# Patient Record
Sex: MALE | Race: WHITE | NOT HISPANIC OR LATINO | Employment: OTHER | URBAN - METROPOLITAN AREA
[De-identification: names, ages, dates, MRNs, and addresses within clinical notes are randomized per-mention and may not be internally consistent; named-entity substitution may affect disease eponyms.]

---

## 2017-01-10 ENCOUNTER — HOSPITAL ENCOUNTER (OUTPATIENT)
Dept: SLEEP MEDICINE | Facility: HOSPITAL | Age: 67
Discharge: HOME | End: 2017-01-10
Attending: NURSE PRACTITIONER
Payer: MEDICARE

## 2017-01-10 DIAGNOSIS — R06.83 PRIMARY SNORING: ICD-10-CM

## 2017-01-10 DIAGNOSIS — G47.61 PLMD (PERIODIC LIMB MOVEMENT DISORDER): ICD-10-CM

## 2017-01-10 DIAGNOSIS — G47.30 SLEEP APNEA, UNSPECIFIED TYPE: ICD-10-CM

## 2017-01-10 PROCEDURE — 95810 POLYSOM 6/> YRS 4/> PARAM: CPT

## 2017-01-10 PROCEDURE — 95810 POLYSOM 6/> YRS 4/> PARAM: CPT | Mod: 26,,, | Performed by: INTERNAL MEDICINE

## 2017-01-10 NOTE — Clinical Note
The polysomnography electrophysiological record for this patient has been reviewed in its entirety.  Sleep architecture:  Total sleep time was 3.8 hours. Sleep latency was short. REM latency was normal. Sleep efficiency was low. Wake after sleep onset was high: 31% of sleep period. Sleep architecture was normal.  Respiratory:   Moderate snoring recorded. The overall AHI was 3.9 (15 events). Only 1.3% of sleep was in supine position.  Motor movement / Parasomnia: No PLM's.   Cardiac: Normal, Average HR was 59 BPM.  Patient perception: It took minutes to fall asleep, slept okay, and quality same as usual.  Medications:  Amlodipine and Metoprolol.  IMPRESSION:  1. Normal Apnea Hypopnea index, No obstructive sleep apnea detected 2. Loud snoring. 3. Morbid Obesity based on BMI. 4. Mild Periodic limb movements with arousal. Clinical correlation for RLS although typically sleep onset would be delayed in RLS.  RECOMMENDATION:  1. Treatment for snoring like mandibular device and nasal Therevent

## 2017-01-13 NOTE — PROCEDURES
"The polysomnography electrophysiological record for this patient has been reviewed in its entirety.    Sleep architecture:  Total sleep time was 3.8 hours. Sleep latency was short. REM latency was normal. Sleep efficiency was low. Wake after sleep onset was high: 31% of sleep period. Sleep architecture was normal.    Respiratory:   Moderate snoring recorded. The overall AHI was 3.9 (15 events). Only 1.3% of sleep was in supine position.    Motor movement / Parasomnia: No PLMs.     Cardiac: Normal, Average HR was 59 BPM.    Patient perception: It took minutes to fall asleep, slept okay, and quality same as usual.    Medications:  Amlodipine and Metoprolol.    IMPRESSION:    1. Normal Apnea Hypopnea index, No obstructive sleep apnea detected  2. Loud snoring.  3. Morbid Obesity based on BMI.  4. Mild Periodic limb movements with arousal. Clinical correlation for RLS although typically sleep onset would be delayed in RLS.    RECOMMENDATION:    1. Treatment for snoring like mandibular device and nasal Therevent may be considered.  2. Consider ENT evaluation.  3. Improved sleep hygiene, avoid daytime napping, optimize respiratory status and if daytime sleepiness remains consider PSG with MSLT.  4. Check TSH  5. Weight loss (Significant). Good data in Behavioral-based treatment programs. 2013 AHA/ACC/TOS guideline for the management of overweight and obesity in adults: a report of the American College of Cardiology/American Heart Association Task Force on Practice Guidelines and The Obesity Society    See imported Sleep Study result in "Chart Review" under the   "Media tab".      (This Sleep Study was interpreted by a Board Certified Sleep   Specialist who conducted an epoch-by-epoch review of the entire   raw data recording.)     (The indication for this sleep study was reviewed and deemed   appropriate by AASM Practice Parameters or other reasons by a   Board Certified Sleep Specialist.)    Cr Leonardo MD      "

## 2017-01-17 ENCOUNTER — TELEPHONE (OUTPATIENT)
Dept: PULMONOLOGY | Facility: CLINIC | Age: 67
End: 2017-01-17

## 2017-01-17 NOTE — TELEPHONE ENCOUNTER
----- Message from Trey Nunes MA sent at 1/16/2017 10:59 AM CST -----  Contact: pt      ----- Message -----     From: Hipolito Alfaro     Sent: 1/16/2017  10:47 AM       To: [facility] Staff    He's calling in regards to the RX for an inhaler, pt states his INS will not cover this medication, please advise, [phone] (home)

## 2017-01-17 NOTE — TELEPHONE ENCOUNTER
Pt states ins won't pay for Advair - is there an alternative or should I send to [facility]?

## 2017-01-20 ENCOUNTER — TELEPHONE (OUTPATIENT)
Dept: PHARMACY | Facility: CLINIC | Age: 67
End: 2017-01-20

## 2017-01-20 NOTE — TELEPHONE ENCOUNTER
Tried to contact patient concerning referral for medication.  Left message for patient to return my call.

## 2017-01-25 ENCOUNTER — OFFICE VISIT (OUTPATIENT)
Dept: NEPHROLOGY | Facility: CLINIC | Age: 67
End: 2017-01-25
Payer: MEDICARE

## 2017-01-25 VITALS
HEIGHT: 71 IN | BODY MASS INDEX: 33.4 KG/M2 | WEIGHT: 238.56 LBS | HEART RATE: 52 BPM | SYSTOLIC BLOOD PRESSURE: 126 MMHG | DIASTOLIC BLOOD PRESSURE: 74 MMHG

## 2017-01-25 DIAGNOSIS — N40.1 BENIGN NON-NODULAR PROSTATIC HYPERPLASIA WITH LOWER URINARY TRACT SYMPTOMS: ICD-10-CM

## 2017-01-25 DIAGNOSIS — I10 ESSENTIAL HYPERTENSION: ICD-10-CM

## 2017-01-25 DIAGNOSIS — Z87.442 HISTORY OF NEPHROLITHIASIS: ICD-10-CM

## 2017-01-25 DIAGNOSIS — N18.31 CHRONIC KIDNEY DISEASE (CKD) STAGE G3A/A1, MODERATELY DECREASED GLOMERULAR FILTRATION RATE (GFR) BETWEEN 45-59 ML/MIN/1.73 SQUARE METER AND ALBUMINURIA CREATININE RATIO LESS THAN 30 MG/G: Primary | ICD-10-CM

## 2017-01-25 PROCEDURE — 99999 PR PBB SHADOW E&M-EST. PATIENT-LVL III: CPT | Mod: PBBFAC,,, | Performed by: INTERNAL MEDICINE

## 2017-01-25 PROCEDURE — 3074F SYST BP LT 130 MM HG: CPT | Mod: S$GLB,,, | Performed by: INTERNAL MEDICINE

## 2017-01-25 PROCEDURE — 1157F ADVNC CARE PLAN IN RCRD: CPT | Mod: S$GLB,,, | Performed by: INTERNAL MEDICINE

## 2017-01-25 PROCEDURE — 1160F RVW MEDS BY RX/DR IN RCRD: CPT | Mod: S$GLB,,, | Performed by: INTERNAL MEDICINE

## 2017-01-25 PROCEDURE — 3078F DIAST BP <80 MM HG: CPT | Mod: S$GLB,,, | Performed by: INTERNAL MEDICINE

## 2017-01-25 PROCEDURE — 99204 OFFICE O/P NEW MOD 45 MIN: CPT | Mod: S$GLB,,, | Performed by: INTERNAL MEDICINE

## 2017-01-25 PROCEDURE — 1159F MED LIST DOCD IN RCRD: CPT | Mod: S$GLB,,, | Performed by: INTERNAL MEDICINE

## 2017-01-25 PROCEDURE — 1126F AMNT PAIN NOTED NONE PRSNT: CPT | Mod: S$GLB,,, | Performed by: INTERNAL MEDICINE

## 2017-01-25 NOTE — PATIENT INSTRUCTIONS
1.  Chronic kidney disease stage III most likely obstructive nephropathy.  Agree with stopping nonsteroidals.  Continue with the current medications.  Patient is on ACE inhibitor.  Echo and cardiac workup noted.  Blood pressures well controlled.  He can proceed with IV dye if we couldn't give him hydration before and after the CT scan with contrast.  Will do routine workup on proteinuria and renal ultrasound and screening for hyperparathyroidism and vitamin D deficiency.      Proceed with IV dye with hydration before and after the scan

## 2017-01-25 NOTE — MR AVS SNAPSHOT
[facility]  [address] [address]  Phone: [phone]  Fax: [phone]                  Monster Clinton   2017 8:30 AM   Office Visit    Description:  Male : 1950   Provider:  Nini Melgar MD   Department:  [facility]           Reason for Visit     Consult     Chronic Kidney Disease           Diagnoses this Visit        Comments    Chronic kidney disease (CKD) stage G3a/A1, moderately decreased glomerular filtration rate (GFR) between 45-59 mL/min/1.73 square meter and albuminuria creatinine ratio less than 30 mg/g    -  Primary     Essential hypertension         Benign non-nodular prostatic hyperplasia with lower urinary tract symptoms         History of nephrolithiasis                To Do List           Future Appointments        Provider Department Dept Phone    2017 8:20 AM Teri Valencia NP [facility] - Pulmonary Services [phone]    3/7/2017 8:00 AM ONLH XR1-DR [facility] [phone]    3/7/2017 8:20 AM SPIROMETRY, [facility] Pulm Function [facility] [phone]    3/7/2017 8:40 AM Mark Izaugirre MD [facility] Pulmonary Services [phone]    2017 9:30 AM ONLH US1 [facility] [phone]      Goals (5 Years of Data)     None      Follow-Up and Disposition     Return in about 3 months (around 2017).    Follow-up and Disposition History      [facility]     [facility] Nurse Care Line -  Assistance  Registered nurses in the [facility] Center provide clinical advisement, health education, appointment booking, and other advisory services.  Call for this free service at [phone].             Medications           Message regarding Medications     Verify the changes and/or additions to your medication regime listed below are the same as discussed with your clinician today.  If any of these changes or additions are incorrect, please notify your healthcare  "provider.             Verify that the below list of medications is an accurate representation of the medications you are currently taking.  If none reported, the list may be blank. If incorrect, please contact your healthcare provider. Carry this list with you in case of emergency.           Current Medications     albuterol (VENTOLIN HFA) 90 mcg/actuation inhaler Inhale 2 puffs into the lungs every 4 (four) hours as needed for Wheezing or Shortness of Breath.    amlodipine-benazepril 5-20 mg (LOTREL) 5-20 mg per capsule Take 1 capsule by mouth once daily.    ascorbic acid (VITAMIN C) 1000 MG tablet Take 1,000 mg by mouth once daily.    DIETARY SUPPLEMENT ORAL Take 2 drops by mouth 3 (three) times daily. Tumeric    finasteride (PROSCAR) 5 mg tablet Take 5 mg by mouth once daily.    fluticasone-salmeterol 250-50 mcg/dose (ADVAIR) 250-50 mcg/dose diskus inhaler Inhale 1 puff into the lungs 2 (two) times daily.    hydrochlorothiazide (HYDRODIURIL) 12.5 MG Tab Take 12.5 mg by mouth once daily.    metoprolol tartrate (LOPRESSOR) 50 MG tablet     multivitamin capsule Take 1 capsule by mouth once daily.           Clinical Reference Information           Vital Signs - Last Recorded  Most recent update: 1/25/2017  8:11 AM by Kalpana Jenkins LPN    BP Pulse Ht Wt BMI    126/74 (!) 52 5' 11" (1.803 m) 108.2 kg (238 lb 8.6 oz) 33.27 kg/m2      Blood Pressure          Most Recent Value    BP  126/74      Allergies as of 1/25/2017     Ibuprofen      Immunizations Administered on Date of Encounter - 1/25/2017     None      Orders Placed During Today's Visit     Future Labs/Procedures Expected by Expires    Cystatin C, Serum  1/25/2017 3/26/2018    US Retroperitoneal Complete (Kidney and  1/25/2017 1/25/2018    Vitamin D  1/25/2017 3/26/2018    Recurring Lab Work Interval Expires    CBC auto differential   3/26/2018    PTH, intact   3/26/2018    Renal function panel   3/26/2018    Protein / creatinine ratio, urine   3/26/2018    " Urinalysis   3/26/2018      Instructions      1.  Chronic kidney disease stage III most likely obstructive nephropathy.  Agree with stopping nonsteroidals.  Continue with the current medications.  Patient is on ACE inhibitor.  Echo and cardiac workup noted.  Blood pressures well controlled.  He can proceed with IV dye if we couldn't give him hydration before and after the CT scan with contrast.  Will do routine workup on proteinuria and renal ultrasound and screening for hyperparathyroidism and vitamin D deficiency.      Proceed with IV dye with hydration before and after the scan

## 2017-01-25 NOTE — PROGRESS NOTES
Subjective:       Patient ID: Monster Clinton is a 66 y.o. White male who presents for new evaluation of Consult and Chronic Kidney Disease    HPI     Patient is a 66-year-old male with hypertension and history of shortness of breath with history of exposure to asbestosis and remote history of smoking.  He quit drinking alcohol in many years ago.  He is being evaluated for his shortness of breath and his pulmonary disease.  He was noted to have a creatinine of 1.5 and a consultation is requested.  Patient has chronic history of hematuria with multiple episodes of pain in the abdomen.  Patient has been diagnosed with nephrolithiasis in the past.  In the year 2015 he didn't require stent placement in his ureters by urology Dr. ji.  Stent has been removed.  He has been followed by urology constantly for his BPH.  He has had multiple operations on his prostate.  He is now on Proscar.  He does not have much nocturia anymore.  Denies any swelling of the legs.  He is on hydrochlorothiazide.  He gets swelling in the morning in his hands and his face if he does not take hydrochlorothiazide.  His blood pressure has been very well controlled.  He has severe ALLERGY to ibuprofen for which he has to be addressed to the emergency room because of the ALLERGIC reaction.  He is not on any nonsteroidals at this time.        Review of Systems   Constitutional: Negative.  Negative for activity change, appetite change, chills, diaphoresis, fatigue and fever.   HENT: Negative.  Negative for congestion and trouble swallowing.    Eyes: Negative.    Respiratory: Positive for cough and shortness of breath. Negative for chest tightness and wheezing.    Cardiovascular: Negative.  Negative for chest pain, palpitations and leg swelling.   Gastrointestinal: Negative.  Negative for abdominal distention, abdominal pain, nausea and vomiting.   Genitourinary: Negative.  Negative for decreased urine volume, difficulty urinating, dysuria, enuresis,  "flank pain, frequency, hematuria, penile swelling, scrotal swelling and urgency.   Musculoskeletal: Negative.  Negative for arthralgias, back pain, joint swelling and neck pain.   Skin: Negative for rash.   Neurological: Negative.  Negative for tremors, seizures and headaches.   Psychiatric/Behavioral: Negative.  Negative for confusion and sleep disturbance. The patient is not nervous/anxious.        Objective:     Visit Vitals    /74    Pulse (!) 52    Ht 5' 11" (1.803 m)    Wt 108.2 kg (238 lb 8.6 oz)    BMI 33.27 kg/m2        Physical Exam   Constitutional: He is oriented to person, place, and time. He appears well-developed and well-nourished. No distress.   HENT:   Head: Normocephalic.   Eyes: EOM are normal. Pupils are equal, round, and reactive to light.   Neck: Normal range of motion. Neck supple. No JVD present. No thyromegaly present.   Cardiovascular: Normal rate, regular rhythm, S1 normal, S2 normal, normal heart sounds and intact distal pulses.  PMI is not displaced.  Exam reveals no gallop and no friction rub.    No murmur heard.  Pulmonary/Chest: Effort normal and breath sounds normal. No respiratory distress. He has no wheezes. He has no rales. He exhibits no tenderness.   Abdominal: He exhibits no distension and no mass. There is no hepatosplenomegaly. There is no tenderness. There is no rebound and no CVA tenderness. No hernia.   Musculoskeletal: Normal range of motion. He exhibits no edema or tenderness.   Lymphadenopathy:     He has no cervical adenopathy.   Neurological: He is alert and oriented to person, place, and time. He has normal reflexes. He is not disoriented. He displays normal reflexes. No cranial nerve deficit. He exhibits normal muscle tone. Coordination normal.   Skin: Skin is warm and dry. No rash noted. No erythema.   Good circulation in the feet with 2+ DIP and no evidence of cholesterol emboli   Psychiatric: He has a normal mood and affect. His behavior is normal. " Judgment and thought content normal.         Lab Results   Component Value Date    CREATININE 1.5 (H) 12/12/2016    BUN 27 (H) 12/12/2016     01/11/2011    K 3.6 01/11/2011     01/11/2011    CO2 30.8 (H) 01/11/2011     No results found for: WBC, HGB, HCT, MCV, PLT  Lab Results   Component Value Date    CALCIUM 8.5 (L) 01/11/2011       Assessment:    )    1. Chronic kidney disease (CKD) stage G3a/A1, moderately decreased glomerular filtration rate (GFR) between 45-59 mL/min/1.73 square meter and albuminuria creatinine ratio less than 30 mg/g    2. Essential hypertension    3. Benign non-nodular prostatic hyperplasia with lower urinary tract symptoms    4. History of nephrolithiasis        Plan:         1.  Chronic kidney disease stage III most likely obstructive nephropathy.  Agree with stopping nonsteroidals.  Continue with the current medications.  Patient is on ACE inhibitor.  Echo and cardiac workup noted.  Blood pressures well controlled.  He can proceed with IV dye if we couldn't give him hydration before and after the CT scan with contrast.  Will do routine workup on proteinuria and renal ultrasound and screening for hyperparathyroidism and vitamin D deficiency.      Proceed with IV dye with hydration before and after the scan       Nini Melgar MD

## 2017-01-25 NOTE — LETTER
January 25, 2017      Teri Valencia, NP  [address] [facility] [address]           [facility]  [address] [place] [address]  Phone: [phone]  Fax: [phone]          Patient: Monster Clintno   MR Number: [id number]   YOB: 1950   Date of Visit: 1/25/2017       Dear Teri Valencia:    Thank you for referring Monster Clinton to me for evaluation. Attached you will find relevant portions of my assessment and plan of care.    If you have questions, please do not hesitate to call me. I look forward to following Monster Clinton along with you.    Sincerely,    Nini Melgar MD    Enclosure  CC:  MD Raymond Marin III, MD    If you would like to receive this communication electronically, please contact [email or web address] or [phone] to request more information on [employer] Link access.    For providers and/or their staff who would like to refer a patient to [facility], please contact us through our one-stop-shop provider referral line, [facility], at [phone].    If you feel you have received this communication in error or would no longer like to receive these types of communications, please e-mail [email or web address]

## 2017-01-27 ENCOUNTER — OFFICE VISIT (OUTPATIENT)
Dept: PULMONOLOGY | Facility: CLINIC | Age: 67
End: 2017-01-27
Payer: MEDICARE

## 2017-01-27 VITALS
WEIGHT: 241.19 LBS | SYSTOLIC BLOOD PRESSURE: 130 MMHG | BODY MASS INDEX: 33.77 KG/M2 | DIASTOLIC BLOOD PRESSURE: 84 MMHG | HEART RATE: 59 BPM | HEIGHT: 71 IN | OXYGEN SATURATION: 97 %

## 2017-01-27 DIAGNOSIS — R91.8 GROUND GLASS OPACITY PRESENT ON IMAGING OF LUNG: ICD-10-CM

## 2017-01-27 DIAGNOSIS — R06.02 SOB (SHORTNESS OF BREATH) ON EXERTION: ICD-10-CM

## 2017-01-27 DIAGNOSIS — J44.9 COPD, MILD: Primary | ICD-10-CM

## 2017-01-27 DIAGNOSIS — J61 PULMONARY ASBESTOSIS: ICD-10-CM

## 2017-01-27 PROCEDURE — 3075F SYST BP GE 130 - 139MM HG: CPT | Mod: S$GLB,,, | Performed by: NURSE PRACTITIONER

## 2017-01-27 PROCEDURE — 99214 OFFICE O/P EST MOD 30 MIN: CPT | Mod: S$GLB,,, | Performed by: NURSE PRACTITIONER

## 2017-01-27 PROCEDURE — 1160F RVW MEDS BY RX/DR IN RCRD: CPT | Mod: S$GLB,,, | Performed by: NURSE PRACTITIONER

## 2017-01-27 PROCEDURE — 1157F ADVNC CARE PLAN IN RCRD: CPT | Mod: S$GLB,,, | Performed by: NURSE PRACTITIONER

## 2017-01-27 PROCEDURE — 99999 PR PBB SHADOW E&M-EST. PATIENT-LVL III: CPT | Mod: PBBFAC,,, | Performed by: NURSE PRACTITIONER

## 2017-01-27 PROCEDURE — 1159F MED LIST DOCD IN RCRD: CPT | Mod: S$GLB,,, | Performed by: NURSE PRACTITIONER

## 2017-01-27 PROCEDURE — 3079F DIAST BP 80-89 MM HG: CPT | Mod: S$GLB,,, | Performed by: NURSE PRACTITIONER

## 2017-01-27 NOTE — MR AVS SNAPSHOT
[facility]  [address] [address]  Phone: [phone]  Fax: [phone]                  Monster Clinton   2017 8:20 AM   Office Visit    Description:  Male : 1950   Provider:  Teri Valencia NP   Department:  [facility]           Reason for Visit     Sleep Apnea           Diagnoses this Visit        Comments    COPD, mild    -  Primary sob and wheezing resolved with beginning Advair, Advair too expensive want alternate, new script for incruse covered by insurance.     Pulmonary asbestosis     exposure to     Ground glass opacity present on imaging of lung     CT 2016, no nodules.     SOB (shortness of breath) on exertion     resolved with advair           To Do List           Future Appointments        Provider Department Dept Phone    3/10/2017 7:45 AM ONL XR1- [facility] [phone]    3/10/2017 8:00 AM PULMONARY LAB, [facility] - Pulm Function Svcs [phone]    3/10/2017 8:20 AM Teri Valencia NP [facility]monary Services [phone]    2017 9:30 AM ONLH US1 [facility] [phone]    2017 10:20 AM SPECIMEN, 'NEAL [facility] [phone]      Goals (5 Years of Data)     None      Follow-Up and Disposition     Return in about 6 weeks (around 3/7/2017).    Follow-up and Disposition History       These Medications        Disp Refills Start End    umeclidinium 62.5 mcg/actuation DsDv 30 each 12 2017     Inhale 62.5 mcg into the lungs once daily. Controller - Inhalation    [facility] [address] #: [phone]         [facility]     [facility] On [facility] Nurse Care Line -  Assistance  Registered nurses in the [facility] On [facility] Center provide clinical advisement, health education, appointment booking, and other advisory services.  Call for this free service at  "[phone].             Medications           Message regarding Medications     Verify the changes and/or additions to your medication regime listed below are the same as discussed with your clinician today.  If any of these changes or additions are incorrect, please notify your healthcare provider.        START taking these NEW medications        Refills    umeclidinium 62.5 mcg/actuation DsDv 12    Sig: Inhale 62.5 mcg into the lungs once daily. Controller    Class: Normal    Route: Inhalation           Verify that the below list of medications is an accurate representation of the medications you are currently taking.  If none reported, the list may be blank. If incorrect, please contact your healthcare provider. Carry this list with you in case of emergency.           Current Medications     albuterol (VENTOLIN HFA) 90 mcg/actuation inhaler Inhale 2 puffs into the lungs every 4 (four) hours as needed for Wheezing or Shortness of Breath.    amlodipine-benazepril 5-20 mg (LOTREL) 5-20 mg per capsule Take 1 capsule by mouth once daily.    ascorbic acid (VITAMIN C) 1000 MG tablet Take 1,000 mg by mouth once daily.    DIETARY SUPPLEMENT ORAL Take 2 drops by mouth 3 (three) times daily. Tumeric    finasteride (PROSCAR) 5 mg tablet Take 5 mg by mouth once daily.    fluticasone-salmeterol 250-50 mcg/dose (ADVAIR) 250-50 mcg/dose diskus inhaler Inhale 1 puff into the lungs 2 (two) times daily.    hydrochlorothiazide (HYDRODIURIL) 12.5 MG Tab Take 12.5 mg by mouth once daily.    metoprolol tartrate (LOPRESSOR) 50 MG tablet     multivitamin capsule Take 1 capsule by mouth once daily.    umeclidinium 62.5 mcg/actuation DsDv Inhale 62.5 mcg into the lungs once daily. Controller           Clinical Reference Information           Vital Signs - Last Recorded  Most recent update: 1/27/2017  8:17 AM by Eri Bettencourt LPN    BP Pulse Ht Wt SpO2 BMI    130/84 (!) 59 5' 11" (1.803 m) 109.4 kg (241 lb 2.9 oz) 97% 33.64 kg/m2    "   Blood Pressure          Most Recent Value    BP  130/84      Allergies as of 1/27/2017     Ibuprofen      Immunizations Administered on Date of Encounter - 1/27/2017     None

## 2017-01-27 NOTE — PROGRESS NOTES
Established Patient    Subjective:      Patient ID: Monster Clinton is a 66 y.o. male.    Patient Active Problem List   Diagnosis    Pulmonary asbestosis    Essential hypertension    Stroke    SOB (shortness of breath)    Sleep apnea    Primary snoring    PLMD (periodic limb movement disorder)    Benign non-nodular prostatic hyperplasia with lower urinary tract symptoms    History of nephrolithiasis       Problem list has been reviewed.    he has been referred by No ref. provider found for evaluation and management for   Chief Complaint   Patient presents with    Sleep Apnea     review sleep fu       Chief Complaint: Sleep Apnea (review sleep fu)      HPI:  He presents for review of sleep study done on 1/10/2017. No sleep apnea revealed.   He reports his of shortness of breath has resolved with begin Advair inhaler. Since beginning Adviar he has not had to use Albuterol inhaler. He has tried nasal Therevent in past for snoring and did not find worked well and found uncomfortable so abandoned wearing.     Previous Report Reviewed: lab reports, office notes and radiology reports     Past Medical History: The following portions of the patient's history were reviewed and updated as appropriate:   He  has a past surgical history that includes Hernia repair and ostate surgery.  His family history is not on file.  He  reports that he quit smoking about 38 years ago. He does not have any smokeless tobacco history on file. He reports that he does not drink alcohol or use illicit drugs.  He has a current medication list which includes the following prescription(s): albuterol, amlodipine-benazepril 5-20 mg, ascorbic acid (vitamin c), dietary supplement, finasteride, fluticasone-salmeterol 250-50 mcg/dose, hydrochlorothiazide, metoprolol tartrate, multivitamin, and umeclidinium.  He is allergic to ibuprofen..    Review of Systems   Constitutional: Negative for fever, activity change, appetite change and night sweats (  "states has not been having nights sweats as in past).   HENT: Negative for postnasal drip (improved with beginning flonase) and rhinorrhea.    Eyes: Negative.    Respiratory: Positive for dyspnea on extertion (resolved with beginning Advair). Negative for apnea, cough, shortness of breath (resolved with advair), wheezing (resolved with beginning advair) and use of rescue inhaler.    Genitourinary: Negative.    Endocrine: Negative for cold intolerance and heat intolerance.    Musculoskeletal: Positive for arthralgias (knees and shoulders). Negative for back pain, gait problem and joint swelling.   Gastrointestinal: Positive for acid reflux (takes rolaids and controlled with diet, not eating big meal past 4 pm). Negative for nausea and vomiting.   Neurological: Positive for headaches.   Psychiatric/Behavioral: Negative for sleep disturbance (improved ). The patient is not nervous/anxious.         Objective:     Physical Exam   Constitutional: He is oriented to person, place, and time. He appears well-developed and well-nourished. He is active and cooperative.  Non-toxic appearance. He does not appear ill. No distress.   HENT:   Head: Normocephalic and atraumatic.   Nose: Nose normal.   Mouth/Throat: Oropharynx is clear and moist. No oropharyngeal exudate.   Neck circumference 18-1/2 inches  Mallampati: 4   Eyes: Conjunctivae are normal.   Cardiovascular: Normal rate, regular rhythm and normal heart sounds.    Pulmonary/Chest: Effort normal. No stridor. He has no wheezes. Rales:  very fine crackles in bases posterior    Abdominal: Soft.   Musculoskeletal: He exhibits no edema.   Neurological: He is alert and oriented to person, place, and time.   Skin: Skin is warm and dry.   Psychiatric: He has a normal mood and affect. His behavior is normal. Judgment and thought content normal.   Vitals reviewed.    Vitals:    01/27/17 0816   BP: 130/84   Pulse: (!) 59   SpO2: 97%   Weight: 109.4 kg (241 lb 2.9 oz)   Height: 5' 11" " (1.803 m)     Body mass index is 33.64 kg/(m^2).    Personal Diagnostic Review  CT of chest 2016   There is a pattern of groundglass type abnormal increased tissue markings greatest in the right upper lobe and in the midlung fields.  No focal suspicious area of consolidation is demonstrated.  No pleural surfaces appear free of any significant nodules or calcification.  Extensive cystic lesions are seen within the liver.    Lab    2016  Lab: Inflammatory markers within acceptable normal limits     Pulmonary function tests: 2016  FEV1: 2.35  (68 % predicted), FVC:  3.22 (69 % predicted), FEV1/FVC:  73 (93% predicted), T.34 (79 % predicted), RV/TLVC: 36 (89 % predicted), DLCO: 18.8 (72 % predicted)  Patient interpretation: Normal airflow. Mild restriction. Diffusion capacity is mildly reduced but corrects for alveolar volume.    Sleep study 1/10/2017  IMPRESSION:     1. Normal Apnea Hypopnea index, No obstructive sleep apnea detected  2. Loud snoring.  3. Morbid Obesity based on BMI.  4. Mild Periodic limb movements with arousal. Clinical correlation for RLS although typically sleep onset would be delayed in RLS.     RECOMMENDATION:     1. Treatment for snoring like mandibular device and nasal Therevent may be considered.  2. Consider ENT evaluation.  3. Improved sleep hygiene, avoid daytime napping, optimize respiratory status and if daytime sleepiness remains consider PSG with MSLT.  4. Check TSH  5. Weight loss (Significant).     Assessment:     1. COPD, mild    2. Pulmonary asbestosis    3. Ground glass opacity present on imaging of lung    4. SOB (shortness of breath) on exertion      No orders of the defined types were placed in this encounter.    Plan:     Discussed diagnosis, its evaluation, treatment and usual course. All questions answered.    COPD, mild  Comments:  sob and wheezing resolved with beginning Advair, Advair too expensive want alternate, new script for incruse covered by  insurance.   Orders:  -     umeclidinium 62.5 mcg/actuation DsDv; Inhale 62.5 mcg into the lungs once daily. Controller  Dispense: 30 each; Refill: 12    Pulmonary asbestosis  Comments:  exposure to     Ground glass opacity present on imaging of lung  Comments:  CT 12/12/2016, no nodules.     SOB (shortness of breath) on exertion  Comments:  resolved with advair      Will stop advair for trial of incruse if shortness of breath returns will add steroid inhaler to incruse.     MEDICAL DECISION MAKING: Moderate complexity.  Overall, the multiple problems listed are of moderate to high severity that may impact quality of life and activities of daily living. Side effects of medications, treatment plan as well as options and alternatives reviewed and discussed with patient. There was counseling of patient concerning these issues and inhaler instruction.     TIME SPENT WITH PATIENT: Time spent:25 minutes in face to face  discussion concerning diagnosis, prognosis, review of lab and test results, benefits of treatment as well as management of disease, counseling of patient and coordination of care between various health  care providers . Greater than half the time spent was used for coordination of care and counseling of patient.     Return in about 6 weeks (around 3/7/2017).

## 2017-11-10 ENCOUNTER — OFFICE VISIT (OUTPATIENT)
Dept: FAMILY MEDICINE | Facility: CLINIC | Age: 67
End: 2017-11-10
Payer: MEDICARE

## 2017-11-10 ENCOUNTER — TELEPHONE (OUTPATIENT)
Dept: FAMILY MEDICINE | Facility: CLINIC | Age: 67
End: 2017-11-10

## 2017-11-10 VITALS
WEIGHT: 240 LBS | SYSTOLIC BLOOD PRESSURE: 136 MMHG | OXYGEN SATURATION: 98 % | DIASTOLIC BLOOD PRESSURE: 80 MMHG | RESPIRATION RATE: 14 BRPM | BODY MASS INDEX: 33.6 KG/M2 | TEMPERATURE: 98 F | HEART RATE: 60 BPM | HEIGHT: 71 IN

## 2017-11-10 DIAGNOSIS — J61 PULMONARY ASBESTOSIS: ICD-10-CM

## 2017-11-10 DIAGNOSIS — I10 ESSENTIAL HYPERTENSION: Primary | ICD-10-CM

## 2017-11-10 DIAGNOSIS — Z12.11 COLON CANCER SCREENING: ICD-10-CM

## 2017-11-10 DIAGNOSIS — Z11.59 NEED FOR HEPATITIS C SCREENING TEST: ICD-10-CM

## 2017-11-10 DIAGNOSIS — G47.30 SLEEP APNEA, UNSPECIFIED TYPE: ICD-10-CM

## 2017-11-10 DIAGNOSIS — H91.93 BILATERAL HEARING LOSS, UNSPECIFIED HEARING LOSS TYPE: ICD-10-CM

## 2017-11-10 DIAGNOSIS — Z86.73 HISTORY OF CVA (CEREBROVASCULAR ACCIDENT): ICD-10-CM

## 2017-11-10 DIAGNOSIS — Z87.891 HISTORY OF SMOKING: ICD-10-CM

## 2017-11-10 DIAGNOSIS — J44.9 COPD, MILD: ICD-10-CM

## 2017-11-10 DIAGNOSIS — I70.0 AORTIC ATHEROSCLEROSIS: ICD-10-CM

## 2017-11-10 DIAGNOSIS — I51.89 DIASTOLIC DYSFUNCTION: ICD-10-CM

## 2017-11-10 DIAGNOSIS — G47.61 PLMD (PERIODIC LIMB MOVEMENT DISORDER): ICD-10-CM

## 2017-11-10 DIAGNOSIS — N40.1 BENIGN NON-NODULAR PROSTATIC HYPERPLASIA WITH LOWER URINARY TRACT SYMPTOMS: ICD-10-CM

## 2017-11-10 DIAGNOSIS — N18.31 CHRONIC KIDNEY DISEASE (CKD) STAGE G3A/A1, MODERATELY DECREASED GLOMERULAR FILTRATION RATE (GFR) BETWEEN 45-59 ML/MIN/1.73 SQUARE METER AND ALBUMINURIA CREATININE RATIO LESS THAN 30 MG/G: ICD-10-CM

## 2017-11-10 DIAGNOSIS — Z13.6 SCREENING FOR AAA (ABDOMINAL AORTIC ANEURYSM): ICD-10-CM

## 2017-11-10 PROCEDURE — 99214 OFFICE O/P EST MOD 30 MIN: CPT | Mod: S$GLB,,, | Performed by: FAMILY MEDICINE

## 2017-11-10 PROCEDURE — 99999 PR PBB SHADOW E&M-EST. PATIENT-LVL IV: CPT | Mod: PBBFAC,,, | Performed by: FAMILY MEDICINE

## 2017-11-10 RX ORDER — TRIAMCINOLONE ACETONIDE 1 MG/G
CREAM TOPICAL
COMMUNITY
Start: 2017-10-06

## 2017-11-10 RX ORDER — AMLODIPINE AND BENAZEPRIL HYDROCHLORIDE 5; 20 MG/1; MG/1
1 CAPSULE ORAL DAILY
Qty: 90 CAPSULE | Refills: 0 | Status: SHIPPED | OUTPATIENT
Start: 2017-11-10 | End: 2018-02-13 | Stop reason: SDUPTHER

## 2017-11-10 RX ORDER — METOPROLOL TARTRATE 50 MG/1
50 TABLET ORAL 2 TIMES DAILY
Qty: 180 TABLET | Refills: 0 | Status: SHIPPED | OUTPATIENT
Start: 2017-11-10 | End: 2018-02-13 | Stop reason: SDUPTHER

## 2017-11-10 RX ORDER — HYDROCHLOROTHIAZIDE 12.5 MG/1
12.5 TABLET ORAL DAILY
Qty: 90 TABLET | Refills: 0 | Status: SHIPPED | OUTPATIENT
Start: 2017-11-10 | End: 2018-02-13 | Stop reason: SDUPTHER

## 2017-11-10 NOTE — TELEPHONE ENCOUNTER
"Patient states that he does not want to schedule   The U/S aorta Abdominal/ "he is going hunting " will call back to schedule.  "

## 2017-11-10 NOTE — LETTER
ALEXIS CLINTON   1950             [facility]  [address]  [facility] [address]  Phone: [phone]  Fax: [phone] November 10, 2017     Patient: Alexis Clinton    YOB: 1950   Date of Visit: 11/10/2017       To Whom It May Concern:     Dr. Baugh is requesting a copy of pt's medical records   Please fax records to [phone]    If you have any questions or concerns, please don't hesitate to call.    Sincerely,  Brianna K., [facility]  Phone #[phone] Fax # [phone]

## 2017-11-10 NOTE — PROGRESS NOTES
Subjective:       Patient ID: Monster Clinton is a 67 y.o. male.    Chief Complaint: Hypertension; COPD; and Establish Care    Hypertension   This is a chronic problem. The current episode started more than 1 year ago. The problem is unchanged. The problem is controlled. Pertinent negatives include no chest pain, headaches, malaise/fatigue, orthopnea, palpitations, peripheral edema or shortness of breath. There are no associated agents to hypertension. Risk factors for coronary artery disease include obesity and male gender. Past treatments include ACE inhibitors, calcium channel blockers and beta blockers. The current treatment provides significant improvement. There are no compliance problems.  Hypertensive end-organ damage includes CVA. There is no history of CAD/MI, heart failure or a thyroid problem. Identifiable causes of hypertension include sleep apnea. There is no history of coarctation of the aorta.     COPD  Patient reports having diagnosis of mild COPD. He admits to having occasional shortness of breath that is alleviated with use of Ventolin. He notes having cough intermittently but this is attributed to choking. He notes that the choking stems from his CVA. He has been evaluated by Pulmonology but has not had an assessment since January 2017. He has not had any ER visits or hospitalizations due to COPD. He has known asbestosis exposure remotely. Last CXR was obtained in December 2016 without acute findings. He does not vaccinate- no flu or PNA vaccinations previously and he declines updates. In late 2016 he underwent assessment per Cardiology in light of SOB complaint. Echocardiogram findings are consistent with diastolic dysfunction. Stress testing was negative for ischemia.    Past Medical History:   Diagnosis Date    Hypertension     Pulmonary asbestosis     Sleep apnea     Stroke      Past Surgical History:   Procedure Laterality Date    HERNIA REPAIR      PROSTATE SURGERY       History  "reviewed. No pertinent family history.    Review of Systems   Constitutional: Negative for appetite change, fatigue, malaise/fatigue and unexpected weight change.   HENT: Positive for hearing loss (chronic ). Negative for congestion, sore throat and trouble swallowing.    Eyes: Negative for pain and visual disturbance.   Respiratory: Negative for cough, chest tightness and shortness of breath.    Cardiovascular: Negative for chest pain, palpitations and orthopnea.   Gastrointestinal: Negative for abdominal pain, blood in stool, constipation and diarrhea.   Endocrine: Negative for cold intolerance and heat intolerance.   Genitourinary: Negative for decreased urine volume and difficulty urinating.   Musculoskeletal: Negative for arthralgias and myalgias.   Skin: Negative for color change and rash.   Neurological: Negative for dizziness, weakness and headaches.   Psychiatric/Behavioral: Negative for dysphoric mood and sleep disturbance. The patient is not nervous/anxious.        Objective:   /80   Pulse 60   Temp 97.8 °F (36.6 °C) (Temporal)   Resp 14   Ht 5' 11" (1.803 m)   Wt 108.8 kg (239 lb 15.5 oz)   SpO2 98%   BMI 33.47 kg/m²   Physical Exam   Constitutional: He is oriented to person, place, and time. He appears well-developed. No distress.   Obese, non-toxic   HENT:   Head: Normocephalic and atraumatic.   Right Ear: Tympanic membrane, external ear and ear canal normal.   Left Ear: Tympanic membrane, external ear and ear canal normal.   Nose: Nose normal.   Mouth/Throat: Oropharynx is clear and moist.   Eyes: Conjunctivae and EOM are normal. Pupils are equal, round, and reactive to light.   Neck: Normal range of motion. Neck supple.   Cardiovascular: Normal rate, regular rhythm and normal heart sounds.    Pulmonary/Chest: Effort normal and breath sounds normal.   Abdominal: Soft. Bowel sounds are normal. There is no tenderness.   Musculoskeletal: He exhibits no edema.   Neurological: He is alert and " oriented to person, place, and time.   Skin: Skin is warm and dry. He is not diaphoretic.   Psychiatric: He has a normal mood and affect. His behavior is normal.       Assessment:       1. Essential hypertension    2. Chronic kidney disease (CKD) stage G3a/A1, moderately decreased glomerular filtration rate (GFR) between 45-59 mL/min/1.73 square meter and albuminuria creatinine ratio less than 30 mg/g    3. Benign non-nodular prostatic hyperplasia with lower urinary tract symptoms    4. PLMD (periodic limb movement disorder)    5. COPD, mild    6. Pulmonary asbestosis    7. Sleep apnea, unspecified type    8. Bilateral hearing loss, unspecified hearing loss type    9. Aortic atherosclerosis    10. Diastolic dysfunction    11. History of CVA (cerebrovascular accident)    12. History of smoking    13. Screening for AAA (abdominal aortic aneurysm)    14. Need for hepatitis C screening test    15. Colon cancer screening        Plan:   Essential hypertension  Stable and well controlled. Will continue with current treatment in combination with heart healthy diet. Target BP <140/90. Records request former PCP.   -     Lipid panel; Future; Expected date: 11/10/2017  -     Comprehensive metabolic panel; Future; Expected date: 11/10/2017  -     TSH; Future; Expected date: 11/10/2017  -     metoprolol tartrate (LOPRESSOR) 50 MG tablet; Take 1 tablet (50 mg total) by mouth 2 (two) times daily.  Dispense: 180 tablet; Refill: 0  -     hydroCHLOROthiazide (HYDRODIURIL) 12.5 MG Tab; Take 1 tablet (12.5 mg total) by mouth once daily.  Dispense: 90 tablet; Refill: 0  -     amlodipine-benazepril 5-20 mg (LOTREL) 5-20 mg per capsule; Take 1 capsule by mouth once daily.  Dispense: 90 capsule; Refill: 0    Chronic kidney disease (CKD) stage G3a/A1, moderately decreased glomerular filtration rate (GFR) between 45-59 mL/min/1.73 square meter and albuminuria creatinine ratio less than 30 mg/g  Advised avoidance of NSAID use and compliance  with measures for BP control as above. He is overdue for reassessment per Nephrology- will arrange.    Benign non-nodular prostatic hyperplasia with lower urinary tract symptoms  Followed by Dr. Jas Olguin. Continue with current treatment plan. He admits he has not been seen in over a year and is asked to schedule a routine visit at earliest convenience.    PLMD (periodic limb movement disorder)  Recommend reassessment per Pulmonology. He has not had any follow-up since his sleep evaluation.    COPD, mild  Stable. Continue with current measures. See Pulmonology for follow-up. He has declined immunization update.    Pulmonary asbestosis  As above.    Sleep apnea, unspecified type  As above.    Bilateral hearing loss, unspecified hearing loss type  -     Ambulatory Referral to Audiology    Aortic atherosclerosis  Advised BP and lipid control. Will obtain updated labs.    Diastolic dysfunction  Reviewed echocardiogram. Continue with ACE use.    History of CVA (cerebrovascular accident)  Continue with measures for BP control. Recommend ASA 81mg as per Cardiology. Discussed statin use- will await labs. He has persistent swallowing/choking issues but declines ST.    History of smoking  -     US Abdominal Aorta; Future; Expected date: 11/10/2017    Screening for AAA (abdominal aortic aneurysm)  -     US Abdominal Aorta; Future; Expected date: 11/10/2017    Need for hepatitis C screening test  -     Hepatitis C antibody; Future; Expected date: 11/24/2017    Colon cancer screening  -     Case request GI: COLONOSCOPY

## 2017-11-10 NOTE — PROGRESS NOTES
Patient states he is not ready to schedule his U/S Aorta abdominal  He states he is going  hunting and will call back to schedule.

## 2017-11-13 ENCOUNTER — CLINICAL SUPPORT (OUTPATIENT)
Dept: AUDIOLOGY | Facility: CLINIC | Age: 67
End: 2017-11-13
Payer: MEDICARE

## 2017-11-13 ENCOUNTER — LAB VISIT (OUTPATIENT)
Dept: LAB | Facility: HOSPITAL | Age: 67
End: 2017-11-13
Attending: FAMILY MEDICINE
Payer: MEDICARE

## 2017-11-13 DIAGNOSIS — Z11.59 NEED FOR HEPATITIS C SCREENING TEST: ICD-10-CM

## 2017-11-13 DIAGNOSIS — I10 ESSENTIAL HYPERTENSION: ICD-10-CM

## 2017-11-13 DIAGNOSIS — H90.3 SENSORINEURAL HEARING LOSS, BILATERAL: Primary | ICD-10-CM

## 2017-11-13 LAB
ALBUMIN SERPL BCP-MCNC: 3.8 G/DL
ALP SERPL-CCNC: 73 U/L
ALT SERPL W/O P-5'-P-CCNC: 20 U/L
ANION GAP SERPL CALC-SCNC: 10 MMOL/L
AST SERPL-CCNC: 19 U/L
BILIRUB SERPL-MCNC: 0.6 MG/DL
BUN SERPL-MCNC: 26 MG/DL
CALCIUM SERPL-MCNC: 9.3 MG/DL
CHLORIDE SERPL-SCNC: 102 MMOL/L
CHOLEST SERPL-MCNC: 197 MG/DL
CHOLEST/HDLC SERPL: 5.1 {RATIO}
CO2 SERPL-SCNC: 28 MMOL/L
CREAT SERPL-MCNC: 1.3 MG/DL
EST. GFR  (AFRICAN AMERICAN): >60 ML/MIN/1.73 M^2
EST. GFR  (NON AFRICAN AMERICAN): 55.7 ML/MIN/1.73 M^2
GLUCOSE SERPL-MCNC: 104 MG/DL
HDLC SERPL-MCNC: 39 MG/DL
HDLC SERPL: 19.8 %
LDLC SERPL CALC-MCNC: 124.6 MG/DL
NONHDLC SERPL-MCNC: 158 MG/DL
POTASSIUM SERPL-SCNC: 3.8 MMOL/L
PROT SERPL-MCNC: 7.1 G/DL
SODIUM SERPL-SCNC: 140 MMOL/L
TRIGL SERPL-MCNC: 167 MG/DL
TSH SERPL DL<=0.005 MIU/L-ACNC: 0.77 UIU/ML

## 2017-11-13 PROCEDURE — 92567 TYMPANOMETRY: CPT | Mod: S$GLB,,, | Performed by: AUDIOLOGIST-HEARING AID FITTER

## 2017-11-13 PROCEDURE — 80061 LIPID PANEL: CPT

## 2017-11-13 PROCEDURE — 92557 COMPREHENSIVE HEARING TEST: CPT | Mod: S$GLB,,, | Performed by: AUDIOLOGIST-HEARING AID FITTER

## 2017-11-13 PROCEDURE — 36415 COLL VENOUS BLD VENIPUNCTURE: CPT | Mod: PO

## 2017-11-13 PROCEDURE — 84443 ASSAY THYROID STIM HORMONE: CPT

## 2017-11-13 PROCEDURE — 80053 COMPREHEN METABOLIC PANEL: CPT

## 2017-11-13 PROCEDURE — 86803 HEPATITIS C AB TEST: CPT

## 2017-11-13 NOTE — PROGRESS NOTES
Referring provider: Dr. Baugh    Monster Clinton was seen 11/13/2017 for an audiological evaluation.  Patient complains of bilateral hearing loss for many years.  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  He denies tinnitus.  He has family history (mother, father) of hearing loss.  He is unable to understand speech unless facing him, noting increased difficulty understanding his wife.        Results reveal a moderate-to-severe sensorineural hearing loss 250-8000 Hz for the right ear, and a mild-to-severe sensorineural hearing loss 250-8000 Hz for the left ear.   Speech Reception Thresholds were 35 dBHL for the right ear and 35 dBHL for the left ear.   Word recognition scores were good for the right ear and good for the left ear.   Tympanograms were Type A, normal for the right ear and Type A, normal for the left ear.    Patient was counseled on the above findings.    Recommendations:  1. Hearing aids, binaural.  Information packet was provided.  He will review with his wife and return when ready.  2. Hearing protection around loud noises.  Does not use when hunting so that he may hear the deer - custom electronic hunters protection is an option to consider.

## 2017-11-14 LAB — HCV AB SERPL QL IA: NEGATIVE

## 2017-12-11 ENCOUNTER — OFFICE VISIT (OUTPATIENT)
Dept: PULMONOLOGY | Facility: CLINIC | Age: 67
End: 2017-12-11
Payer: MEDICARE

## 2017-12-11 VITALS
DIASTOLIC BLOOD PRESSURE: 80 MMHG | HEIGHT: 71 IN | HEART RATE: 74 BPM | BODY MASS INDEX: 33.53 KG/M2 | RESPIRATION RATE: 18 BRPM | SYSTOLIC BLOOD PRESSURE: 140 MMHG | WEIGHT: 239.5 LBS | OXYGEN SATURATION: 96 %

## 2017-12-11 DIAGNOSIS — J61 PULMONARY ASBESTOSIS: ICD-10-CM

## 2017-12-11 DIAGNOSIS — J44.9 COPD, MILD: ICD-10-CM

## 2017-12-11 PROCEDURE — 99999 PR PBB SHADOW E&M-EST. PATIENT-LVL III: CPT | Mod: PBBFAC,,, | Performed by: NURSE PRACTITIONER

## 2017-12-11 PROCEDURE — 99213 OFFICE O/P EST LOW 20 MIN: CPT | Mod: S$GLB,,, | Performed by: NURSE PRACTITIONER

## 2017-12-11 NOTE — PROGRESS NOTES
Established Patient    Subjective:      Patient ID: Monster Clinton is a 67 y.o. male.    he has been referred by No ref. provider found for evaluation and management for   Chief Complaint   Patient presents with    COPD    Apnea     Chief Complaint: COPD and Apnea    HPI:  He presents for follow up visit related to prior diagnosis of COPD.  Patient was placed on Incruse controller after he declined continuing Advair for shortness of breath with exertion related to out of pocket cost.  He has done very well on Incruse in controlling his shortness of breath with exertion.  He has reduced use to about twice a month. He has ventolin inhaler that is used about twice a month also.   There is rare intermittent non productive cough, no wheezing, rare shortness of breath with prolonged exertion.  No hemoptysis, no sputum production, no weight loss, no chest pain. 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   He his activity is not limited, he does what he wants to do with pacing.   He had sleep study done on 1/10/2017. No sleep apnea revealed. He has tried nasal Therevent in past for snoring and did not find worked well and found uncomfortable so abandoned wearing.     Previous Report Reviewed: lab reports, office notes and radiology reports     Past Medical History: The following portions of the patient's history were reviewed and updated as appropriate:   He  has a past surgical history that includes Hernia repair and Prostate surgery.  His family history includes Cancer in his mother; Heart disease in his father.  He  reports that he quit smoking about 39 years ago. He has never used smokeless tobacco. He reports that he does not drink alcohol or use drugs.  He has a current medication list which includes the following prescription(s): albuterol, amlodipine-benazepril 5-20 mg, finasteride, hydrochlorothiazide, metoprolol  "tartrate, multivitamin, umeclidinium, and triamcinolone acetonide 0.1%.  He is allergic to ibuprofen..    Review of Systems   Constitutional: Negative for fever, activity change, appetite change and night sweats ( states has not been having nights sweats as in past).   HENT: Negative for postnasal drip (on flonase) and rhinorrhea.    Eyes: Negative.    Respiratory: Positive for dyspnea on extertion (improved since on Incruse and albuterol, use of either about twice a month). Negative for apnea, cough, shortness of breath (resolved with advair), wheezing (resolved with beginning advair) and use of rescue inhaler.    Genitourinary: Negative.    Endocrine: Negative for cold intolerance and heat intolerance.    Musculoskeletal: Positive for arthralgias (knees and shoulders, chronic). Negative for back pain, gait problem and joint swelling.   Gastrointestinal: Positive for acid reflux (takes rolaids and controlled with diet, not eating big meal past 4 pm). Negative for nausea and vomiting.   Neurological: Negative.    Psychiatric/Behavioral: Negative for sleep disturbance (improved ). The patient is not nervous/anxious.         Objective:   BP (!) 140/80 (BP Location: Right arm, Patient Position: Sitting)   Pulse 74   Resp 18   Ht 5' 10.98" (1.803 m)   Wt 108.7 kg (239 lb 8.5 oz)   SpO2 96%   BMI 33.42 kg/m²   Physical Exam   Constitutional: He is oriented to person, place, and time. He appears well-developed and well-nourished. He is active and cooperative.  Non-toxic appearance. He does not appear ill. No distress.   HENT:   Head: Normocephalic and atraumatic.   Right Ear: External ear normal.   Left Ear: External ear normal.   Nose: Nose normal.   Mouth/Throat: Oropharynx is clear and moist. No oropharyngeal exudate.   Neck circumference 18-1/2 inches  Mallampati: 4   Eyes: Conjunctivae are normal.   Neck: Normal range of motion. Neck supple.   Cardiovascular: Normal rate, regular rhythm, normal heart sounds and " intact distal pulses.    Pulmonary/Chest: Effort normal and breath sounds normal. No stridor. He has no wheezes. He has no rales ( very fine crackles in bases posterior ).   Abdominal: Soft.   Musculoskeletal: He exhibits no edema.   Neurological: He is alert and oriented to person, place, and time. He has normal reflexes.   Skin: Skin is warm and dry.   Psychiatric: He has a normal mood and affect. His behavior is normal. Judgment and thought content normal.   Vitals reviewed.    Personal Diagnostic Review    Pulmonary function tests: 2016  FEV1: 2.35  (68 % predicted), FVC:  3.22 (69 % predicted), FEV1/FVC:  73 (93% predicted), T.34 (79 % predicted), RV/TLVC: 36 (89 % predicted), DLCO: 18.8 (72 % predicted)  Patient interpretation: Normal airflow. Mild restriction. Diffusion capacity is mildly reduced but corrects for alveolar volume.    Sleep study 1/10/2017  IMPRESSION:      1. Normal Apnea Hypopnea index, No obstructive sleep apnea detected  2. Loud snoring.  3. Morbid Obesity based on BMI.  4. Mild Periodic limb movements with arousal. Clinical correlation for RLS although typically sleep onset would be delayed in RLS.     RECOMMENDATION:     1. Treatment for snoring like mandibular device and nasal Therevent may be considered.  2. Consider ENT evaluation.  3. Improved sleep hygiene, avoid daytime napping, optimize respiratory status and if daytime sleepiness remains consider PSG with MSLT.  4. Check TSH  5. Weight loss (Significant).     Assessment:     1. COPD, mild    2. Pulmonary asbestosis      Orders Placed This Encounter   Procedures    Spirometry with/without bronchodilator     Standing Status:   Future     Standing Expiration Date:   2018     Plan:     Discussed diagnosis, its evaluation, treatment and usual course. All questions answered.    COPD, mild  Shortness of breath with exertion improved with Incruse and albuterol inhalers, use of either about twice a month.   Follow up in 3  months for spirometry     Pulmonary asbestosis  History of pft 12/9/2016 Normal airflow. Mild restriction. Diffusion capacity is mildly reduced but corrects for alveolar volume    Return in about 3 months (around 3/11/2018) for COPD follow up w/review awilda .

## 2017-12-11 NOTE — ASSESSMENT & PLAN NOTE
Shortness of breath with exertion improved with Incruse and albuterol inhalers, use of either about twice a month.   Follow up in 3 months for spirometry

## 2017-12-11 NOTE — ASSESSMENT & PLAN NOTE
History of pft 12/9/2016 Normal airflow. Mild restriction. Diffusion capacity is mildly reduced but corrects for alveolar volume

## 2018-01-03 ENCOUNTER — TELEPHONE (OUTPATIENT)
Dept: PULMONOLOGY | Facility: CLINIC | Age: 68
End: 2018-01-03

## 2018-01-03 DIAGNOSIS — J44.9 COPD, MILD: Primary | ICD-10-CM

## 2018-01-03 NOTE — TELEPHONE ENCOUNTER
----- Message from Venus Williamson LPN sent at 1/3/2018 12:06 PM CST -----  Contact: PT   [employer] not approving Incruse inhaler, requesting another medication  ----- Message -----  From: Lakeshia Babcock  Sent: 1/3/2018  11:53 AM  To: Annie MELENDEZ Staff    Pt request call back about his medication being rejected by [facility].  Request new medication. ..[phone] (home)

## 2018-02-13 DIAGNOSIS — I10 ESSENTIAL HYPERTENSION: ICD-10-CM

## 2018-02-13 RX ORDER — AMLODIPINE AND BENAZEPRIL HYDROCHLORIDE 5; 20 MG/1; MG/1
1 CAPSULE ORAL DAILY
Qty: 90 CAPSULE | Refills: 0 | Status: SHIPPED | OUTPATIENT
Start: 2018-02-13 | End: 2018-05-18 | Stop reason: SDUPTHER

## 2018-02-13 RX ORDER — METOPROLOL TARTRATE 50 MG/1
50 TABLET ORAL DAILY
Qty: 90 TABLET | Refills: 0 | Status: SHIPPED | OUTPATIENT
Start: 2018-02-13 | End: 2018-05-18 | Stop reason: SDUPTHER

## 2018-02-13 RX ORDER — HYDROCHLOROTHIAZIDE 12.5 MG/1
12.5 TABLET ORAL DAILY
Qty: 90 TABLET | Refills: 0 | Status: SHIPPED | OUTPATIENT
Start: 2018-02-13 | End: 2018-05-18 | Stop reason: SDUPTHER

## 2018-02-13 NOTE — TELEPHONE ENCOUNTER
----- Message from Amrita Paniagua sent at 2/13/2018  2:21 PM CST -----  Contact: self [phone]  States that he is calling to speak to nurse regarding his blood pressure medications. Please call back at [phone]//thank you acc

## 2018-05-07 ENCOUNTER — PATIENT OUTREACH (OUTPATIENT)
Dept: ADMINISTRATIVE | Facility: HOSPITAL | Age: 68
End: 2018-05-07

## 2018-05-07 NOTE — PROGRESS NOTES
Pre visit chart audit performed. Attempting to contact pt to schedule over due HM. Unable to reach patient at this time. Left voicemail.

## 2018-05-16 ENCOUNTER — TELEPHONE (OUTPATIENT)
Dept: INTERNAL MEDICINE | Facility: CLINIC | Age: 68
End: 2018-05-16

## 2018-05-16 NOTE — TELEPHONE ENCOUNTER
----- Message from Hipolito Alfaro sent at 5/16/2018  9:38 AM CDT -----  Contact: pt  1. What is the name of the medication you are requesting? Hydrochlorzide  2. What is the dose?12.5 mg  3. How do you take the medication? Orally, topically, etc? Orally  4. How often do you take this medication? Once daily  5. Do you need a 30 day or 90 day supply? 90  6. How many refills are you requesting? 4   7. What is your preferred pharmacy [facility]  8. Who can we contact with further questions? [phone] (home)      Pt is out of his medication...

## 2018-05-18 ENCOUNTER — OFFICE VISIT (OUTPATIENT)
Dept: FAMILY MEDICINE | Facility: CLINIC | Age: 68
End: 2018-05-18
Payer: MEDICARE

## 2018-05-18 ENCOUNTER — LAB VISIT (OUTPATIENT)
Dept: LAB | Facility: HOSPITAL | Age: 68
End: 2018-05-18
Attending: FAMILY MEDICINE
Payer: MEDICARE

## 2018-05-18 VITALS
RESPIRATION RATE: 15 BRPM | HEART RATE: 60 BPM | DIASTOLIC BLOOD PRESSURE: 80 MMHG | WEIGHT: 229.75 LBS | TEMPERATURE: 98 F | SYSTOLIC BLOOD PRESSURE: 130 MMHG | BODY MASS INDEX: 32.16 KG/M2 | OXYGEN SATURATION: 97 % | HEIGHT: 71 IN

## 2018-05-18 DIAGNOSIS — I10 ESSENTIAL HYPERTENSION: ICD-10-CM

## 2018-05-18 DIAGNOSIS — Z86.73 HISTORY OF CVA (CEREBROVASCULAR ACCIDENT): ICD-10-CM

## 2018-05-18 DIAGNOSIS — R73.03 PREDIABETES: ICD-10-CM

## 2018-05-18 DIAGNOSIS — L72.9 CYST OF SKIN: ICD-10-CM

## 2018-05-18 DIAGNOSIS — J61 PULMONARY ASBESTOSIS: ICD-10-CM

## 2018-05-18 DIAGNOSIS — Z87.891 HISTORY OF SMOKING: ICD-10-CM

## 2018-05-18 DIAGNOSIS — E66.9 OBESITY (BMI 30-39.9): ICD-10-CM

## 2018-05-18 DIAGNOSIS — I10 ESSENTIAL HYPERTENSION: Primary | ICD-10-CM

## 2018-05-18 DIAGNOSIS — Z13.6 SCREENING FOR AAA (ABDOMINAL AORTIC ANEURYSM): ICD-10-CM

## 2018-05-18 DIAGNOSIS — I70.0 AORTIC ATHEROSCLEROSIS: ICD-10-CM

## 2018-05-18 DIAGNOSIS — N18.31 CHRONIC KIDNEY DISEASE (CKD) STAGE G3A/A1, MODERATELY DECREASED GLOMERULAR FILTRATION RATE (GFR) BETWEEN 45-59 ML/MIN/1.73 SQUARE METER AND ALBUMINURIA CREATININE RATIO LESS THAN 30 MG/G: ICD-10-CM

## 2018-05-18 DIAGNOSIS — Z12.11 COLON CANCER SCREENING: ICD-10-CM

## 2018-05-18 DIAGNOSIS — J44.9 COPD, MILD: ICD-10-CM

## 2018-05-18 LAB
ANION GAP SERPL CALC-SCNC: 9 MMOL/L
BUN SERPL-MCNC: 22 MG/DL
CALCIUM SERPL-MCNC: 9.3 MG/DL
CHLORIDE SERPL-SCNC: 103 MMOL/L
CO2 SERPL-SCNC: 28 MMOL/L
CREAT SERPL-MCNC: 1.2 MG/DL
EST. GFR  (AFRICAN AMERICAN): >60 ML/MIN/1.73 M^2
EST. GFR  (NON AFRICAN AMERICAN): >60 ML/MIN/1.73 M^2
ESTIMATED AVG GLUCOSE: 97 MG/DL
GLUCOSE SERPL-MCNC: 99 MG/DL
HBA1C MFR BLD HPLC: 5 %
POTASSIUM SERPL-SCNC: 4 MMOL/L
SODIUM SERPL-SCNC: 140 MMOL/L

## 2018-05-18 PROCEDURE — 99214 OFFICE O/P EST MOD 30 MIN: CPT | Mod: S$GLB,,, | Performed by: FAMILY MEDICINE

## 2018-05-18 PROCEDURE — 3075F SYST BP GE 130 - 139MM HG: CPT | Mod: CPTII,S$GLB,, | Performed by: FAMILY MEDICINE

## 2018-05-18 PROCEDURE — 83036 HEMOGLOBIN GLYCOSYLATED A1C: CPT

## 2018-05-18 PROCEDURE — 36415 COLL VENOUS BLD VENIPUNCTURE: CPT | Mod: PO

## 2018-05-18 PROCEDURE — 3079F DIAST BP 80-89 MM HG: CPT | Mod: CPTII,S$GLB,, | Performed by: FAMILY MEDICINE

## 2018-05-18 PROCEDURE — 99999 PR PBB SHADOW E&M-EST. PATIENT-LVL IV: CPT | Mod: PBBFAC,,, | Performed by: FAMILY MEDICINE

## 2018-05-18 PROCEDURE — 80048 BASIC METABOLIC PNL TOTAL CA: CPT

## 2018-05-18 RX ORDER — ATORVASTATIN CALCIUM 20 MG/1
20 TABLET, FILM COATED ORAL DAILY
Qty: 90 TABLET | Refills: 1 | Status: SHIPPED | OUTPATIENT
Start: 2018-05-18 | End: 2019-05-18

## 2018-05-18 RX ORDER — METOPROLOL TARTRATE 50 MG/1
50 TABLET ORAL DAILY
Qty: 90 TABLET | Refills: 1 | Status: SHIPPED | OUTPATIENT
Start: 2018-05-18

## 2018-05-18 RX ORDER — AMLODIPINE AND BENAZEPRIL HYDROCHLORIDE 5; 20 MG/1; MG/1
1 CAPSULE ORAL DAILY
Qty: 90 CAPSULE | Refills: 1 | Status: SHIPPED | OUTPATIENT
Start: 2018-05-18

## 2018-05-18 RX ORDER — HYDROCHLOROTHIAZIDE 12.5 MG/1
12.5 TABLET ORAL DAILY
Qty: 90 TABLET | Refills: 1 | Status: SHIPPED | OUTPATIENT
Start: 2018-05-18 | End: 2018-06-06 | Stop reason: SDUPTHER

## 2018-05-18 NOTE — PROGRESS NOTES
Subjective:       Patient ID: Monster Clinton is a 68 y.o. male.    Chief Complaint: Chronic Care    HPI   Chronic Care  69yo male presents today for chronic care assessment. Patient reports compliance with his BP medication regimen. States he monitors levels at home but has not maintained a log. No report of HA, CP or dizziness. He has discontinued ASA use as he has not felt it necessary. He has never been on statin therapy in spite of CVA history. There is known aortic atherosclerosis. No longer taking anything for COPD. No recent follow-up with Pulmonology. Denies any cough or SOB and has not had any recent symptoms of exacerbation. Has had recent nephrolithiasis. No follow-up with Urology in over a year. Compliance with Proscar use is reported. Per records obtained from his former PCP he was previously followed for prediabetes. Patient reports no knowledge of this diagnosis. No symptoms of hyper or hypoglycemia are reported. There have been no recent ER visits or hospitalizations.    Review of Systems   Constitutional: Negative for activity change, fatigue and unexpected weight change.   HENT: Positive for hearing loss (chronic). Negative for congestion, rhinorrhea and sore throat.    Eyes: Negative for visual disturbance.   Respiratory: Negative for cough and shortness of breath.    Cardiovascular: Negative for chest pain, palpitations and leg swelling.   Gastrointestinal: Negative for blood in stool, constipation and diarrhea.   Endocrine: Negative for polydipsia, polyphagia and polyuria.   Genitourinary: Negative for decreased urine volume and difficulty urinating.   Musculoskeletal: Negative for arthralgias and myalgias.   Skin: Negative for rash.   Allergic/Immunologic: Negative for environmental allergies.   Neurological: Negative for dizziness and headaches.   Psychiatric/Behavioral: Negative for dysphoric mood and sleep disturbance. The patient is not nervous/anxious.        Objective:   /80    "Pulse 60   Temp 97.8 °F (36.6 °C) (Temporal)   Resp 15   Ht 5' 11" (1.803 m)   Wt 104.2 kg (229 lb 11.5 oz)   SpO2 97%   BMI 32.04 kg/m²   Physical Exam   Constitutional: He is oriented to person, place, and time. He appears well-developed and well-nourished. No distress.   HENT:   Head: Normocephalic and atraumatic.   Right Ear: Tympanic membrane, external ear and ear canal normal.   Left Ear: Tympanic membrane, external ear and ear canal normal.   Nose: Nose normal.   Mouth/Throat: Oropharynx is clear and moist.   Eyes: Conjunctivae and EOM are normal. Pupils are equal, round, and reactive to light.   Neck: Normal range of motion. Neck supple.   Cardiovascular: Normal rate, regular rhythm and normal heart sounds.    Pulmonary/Chest: Effort normal and breath sounds normal.   Abdominal: Soft. Bowel sounds are normal.   Musculoskeletal: He exhibits no edema.   Neurological: He is alert and oriented to person, place, and time.   Skin: Skin is warm and dry. No rash noted. He is not diaphoretic.        Psychiatric: He has a normal mood and affect. His behavior is normal.       Assessment:       1. Essential hypertension    2. COPD, mild    3. Pulmonary asbestosis    4. Chronic kidney disease (CKD) stage G3a/A1, moderately decreased glomerular filtration rate (GFR) between 45-59 mL/min/1.73 square meter and albuminuria creatinine ratio less than 30 mg/g    5. Prediabetes    6. Cyst of skin    7. Aortic atherosclerosis    8. Obesity (BMI 30-39.9)    9. History of smoking    10. History of CVA (cerebrovascular accident)    11. Screening for AAA (abdominal aortic aneurysm)    12. Colon cancer screening        Plan:      Essential hypertension  Stable. Continue with current treatment. Target BP goal remains<140/90. Heart healthy diet is advised. Intermittent home monitoring has been discussed.  -     amlodipine-benazepril 5-20 mg (LOTREL) 5-20 mg per capsule; Take 1 capsule by mouth once daily.  Dispense: 90 capsule; " Refill: 1  -     hydroCHLOROthiazide (HYDRODIURIL) 12.5 MG Tab; Take 1 tablet (12.5 mg total) by mouth once daily.  Dispense: 90 tablet; Refill: 1  -     metoprolol tartrate (LOPRESSOR) 50 MG tablet; Take 1 tablet (50 mg total) by mouth once daily.  Dispense: 90 tablet; Refill: 1  -     Lipid panel; Future; Expected date: 05/18/2018  -     Comprehensive metabolic panel; Future; Expected date: 05/18/2018  -     Basic metabolic panel; Future; Expected date: 05/18/2018    COPD, mild  Recommend updated assessment with Pulmonology. Albuterol use is advised on a prn basis.    Pulmonary asbestosis  As above.    Chronic kidney disease (CKD) stage G3a/A1, moderately decreased glomerular filtration rate (GFR) between 45-59 mL/min/1.73 square meter and albuminuria creatinine ratio less than 30 mg/g  Overdue for follow-up with Nephrology- will arrange. BP control remains advised. Refrain from NSAID use.    Prediabetes  -     Hemoglobin A1c; Future; Expected date: 05/18/2018    Cyst of skin  -     Ambulatory Referral to General Surgery    Aortic atherosclerosis  -     atorvastatin (LIPITOR) 20 MG tablet; Take 1 tablet (20 mg total) by mouth once daily.  Dispense: 90 tablet; Refill: 1    Obesity (BMI 30-39.9)  Weight loss efforts are encouraged through diet and lifestyle measures.    History of smoking  As above, see Pulmonology.    History of CVA (cerebrovascular accident)  -     atorvastatin (LIPITOR) 20 MG tablet; Take 1 tablet (20 mg total) by mouth once daily.  Dispense: 90 tablet; Refill: 1    Screening for AAA (abdominal aortic aneurysm)  -     US Abdominal Aorta; Future; Expected date: 05/18/2018    Colon cancer screening  -     Case request GI: COLONOSCOPY    RTC in 6 months- labs prior to the visit.

## 2018-05-21 ENCOUNTER — TELEPHONE (OUTPATIENT)
Dept: RADIOLOGY | Facility: HOSPITAL | Age: 68
End: 2018-05-21

## 2018-05-23 ENCOUNTER — OFFICE VISIT (OUTPATIENT)
Dept: SURGERY | Facility: CLINIC | Age: 68
End: 2018-05-23
Payer: MEDICARE

## 2018-05-23 ENCOUNTER — DOCUMENTATION ONLY (OUTPATIENT)
Dept: ENDOSCOPY | Facility: HOSPITAL | Age: 68
End: 2018-05-23

## 2018-05-23 VITALS
SYSTOLIC BLOOD PRESSURE: 129 MMHG | HEIGHT: 71 IN | TEMPERATURE: 98 F | BODY MASS INDEX: 32.1 KG/M2 | HEART RATE: 53 BPM | DIASTOLIC BLOOD PRESSURE: 73 MMHG | WEIGHT: 229.25 LBS

## 2018-05-23 DIAGNOSIS — L72.0 CYST OF SKIN AND SUBCUTANEOUS TISSUE: Primary | ICD-10-CM

## 2018-05-23 PROCEDURE — 99203 OFFICE O/P NEW LOW 30 MIN: CPT | Mod: S$GLB,,, | Performed by: SURGERY

## 2018-05-23 PROCEDURE — 3078F DIAST BP <80 MM HG: CPT | Mod: CPTII,S$GLB,, | Performed by: SURGERY

## 2018-05-23 PROCEDURE — 99999 PR PBB SHADOW E&M-EST. PATIENT-LVL III: CPT | Mod: PBBFAC,,, | Performed by: SURGERY

## 2018-05-23 PROCEDURE — 3074F SYST BP LT 130 MM HG: CPT | Mod: CPTII,S$GLB,, | Performed by: SURGERY

## 2018-05-23 NOTE — LETTER
May 23, 2018      Faith Baugh MD  [address] [address] [address]           [facility]  [address] [place] [id number]  Phone: [phone]  Fax: [phone]          Patient: Monster Clinton   MR Number: [id number]   YOB: 1950   Date of Visit: 5/23/2018       Dear Dr. Faith Baugh:    Thank you for referring Monster Clinton to me for evaluation. Attached you will find relevant portions of my assessment and plan of care.    If you have questions, please do not hesitate to call me. I look forward to following Monster Clinton along with you.    Sincerely,    Farhad Kumar MD    Enclosure  CC:  No Recipients    If you would like to receive this communication electronically, please contact [email or web address] or [phone] to request more information on [employer] Link access.    For providers and/or their staff who would like to refer a patient to [facility], please contact us through our one-stop-shop provider referral line, [facility] , at [phone].    If you feel you have received this communication in error or would no longer like to receive these types of communications, please e-mail [email or web address]

## 2018-05-23 NOTE — PROGRESS NOTES
History & Physical    SUBJECTIVE:     History of Present Illness:  Patient is a 68 y.o. male referred for chest wall cyst.  Patient reports it recently popped up. It is causing him discomfort when rubbed against.  He would like to have it removed.  Chief Complaint   Patient presents with    Cyst     knot popped up in the middle of chest       Review of patient's allergies indicates:   Allergen Reactions    Ibuprofen Anaphylaxis       Current Outpatient Prescriptions   Medication Sig Dispense Refill    albuterol (VENTOLIN HFA) 90 mcg/actuation inhaler Inhale 2 puffs into the lungs every 4 (four) hours as needed for Wheezing or Shortness of Breath. 1 Inhaler 5    amlodipine-benazepril 5-20 mg (LOTREL) 5-20 mg per capsule Take 1 capsule by mouth once daily. 90 capsule 1    atorvastatin (LIPITOR) 20 MG tablet Take 1 tablet (20 mg total) by mouth once daily. 90 tablet 1    finasteride (PROSCAR) 5 mg tablet Take 5 mg by mouth once daily.      hydroCHLOROthiazide (HYDRODIURIL) 12.5 MG Tab Take 1 tablet (12.5 mg total) by mouth once daily. 90 tablet 1    metoprolol tartrate (LOPRESSOR) 50 MG tablet Take 1 tablet (50 mg total) by mouth once daily. 90 tablet 1    multivitamin capsule Take 1 capsule by mouth once daily.      triamcinolone acetonide 0.1% (KENALOG) 0.1 % cream        No current facility-administered medications for this visit.        Past Medical History:   Diagnosis Date    Hypertension     Prediabetes     Pulmonary asbestosis     Sleep apnea     Stroke      Past Surgical History:   Procedure Laterality Date    HERNIA REPAIR      PROSTATE SURGERY      Right Knee Arthroscopy  2018     Family History   Problem Relation Age of Onset    Cancer Mother     Heart disease Father      Social History   Substance Use Topics    Smoking status: Former Smoker     Quit date: 12/12/1978    Smokeless tobacco: Never Used    Alcohol use No        Review of Systems:  Review of Systems   Constitutional:  "Negative for chills, fever and unexpected weight change.   HENT: Negative for congestion.    Eyes: Negative for visual disturbance.   Respiratory: Negative for shortness of breath.    Cardiovascular: Negative for chest pain.   Gastrointestinal: Negative for abdominal distention, abdominal pain, constipation, nausea, rectal pain and vomiting.   Genitourinary: Negative for dysuria.   Musculoskeletal: Negative for arthralgias.   Skin: Negative for rash.   Neurological: Negative for light-headedness.   Hematological: Negative for adenopathy.       OBJECTIVE:     Vital Signs (Most Recent)  Temp: 98.3 °F (36.8 °C) (05/23/18 0828)  Pulse: (!) 53 (05/23/18 0828)  BP: 129/73 (05/23/18 0828)  5' 11" (1.803 m)  104 kg (229 lb 4.5 oz)     Physical Exam:  Physical Exam   Constitutional: He is oriented to person, place, and time. He appears well-developed and well-nourished.   HENT:   Head: Normocephalic and atraumatic.   Eyes: EOM are normal.   Neck: Normal range of motion. Neck supple.   Cardiovascular: Normal rate and regular rhythm.    Pulmonary/Chest: Effort normal and breath sounds normal.       Abdominal: Soft. Bowel sounds are normal. He exhibits no distension. There is no tenderness.   Neurological: He is alert and oriented to person, place, and time.   Skin: Skin is warm and dry.   Vitals reviewed.        ASSESSMENT/PLAN:     68-year-old male with chest wall cyst    PLAN:Plan     Schedule for excision and minor procedure room       "

## 2018-05-30 ENCOUNTER — PROCEDURE VISIT (OUTPATIENT)
Dept: SURGERY | Facility: CLINIC | Age: 68
End: 2018-05-30
Payer: MEDICARE

## 2018-05-30 VITALS
HEART RATE: 59 BPM | SYSTOLIC BLOOD PRESSURE: 134 MMHG | DIASTOLIC BLOOD PRESSURE: 79 MMHG | BODY MASS INDEX: 32.17 KG/M2 | HEIGHT: 71 IN | TEMPERATURE: 98 F | WEIGHT: 229.81 LBS

## 2018-05-30 DIAGNOSIS — L72.0 EPIDERMOID CYST OF SKIN OF CHEST: Primary | ICD-10-CM

## 2018-05-30 PROCEDURE — 11401 EXC TR-EXT B9+MARG 0.6-1 CM: CPT | Mod: S$GLB,,, | Performed by: SURGERY

## 2018-05-30 NOTE — PROCEDURES
"Exc, Cyst  Date/Time: 5/30/2018 11:28 AM  Performed by: SEAN IRELAND  Authorized by: SEAN IRELAND     Consent Done?:  Yes (Written)  Time out: Immediately prior to procedure a "time out" was called to verify the correct patient, procedure, equipment, support staff and site/side marked as required.      STAFF:  Assistants?: No    I was present for the entire procedure.  INDICATIONS:cyst  LOCATION:  Body area:  Chest    PREP:  Patient was prepped and draped in the normal sterile fashion.Position:  Supine    ANESTHESIA:  Anesthesia:  Local infiltration  Local anesthetic:  Lidocaine 1% with epinephrine    PROCEDURE DETAILS:  Excision type:  Skin  Malignancy:  Benign  Excision size (cm):  1  Scalpel size: punch biopsy.  Incision type:  Elliptical  Specimens?: No      Hemostasis was obtained.  Estimated blood loss (cc):  1  Sutures:  3-0 Vicryl and 4-0 Monocryl  Sterile dressings:  Gauze, Mastisol, Steri-strips and Tegaderm  Post-op diagnosis: Same as pre-op diagnosis.  Needle, instrument, and sponge counts were correct.  Patient tolerated the procedure well with no immediate complications.  Post-operative instructions were provided for the patient.  Patient was discharged and will follow up for wound check.        "

## 2018-06-05 ENCOUNTER — TELEPHONE (OUTPATIENT)
Dept: FAMILY MEDICINE | Facility: CLINIC | Age: 68
End: 2018-06-05

## 2018-06-05 NOTE — TELEPHONE ENCOUNTER
----- Message from Bisi Duron sent at 6/5/2018 10:27 AM CDT -----  Contact: pt  Pt stated he's calling about his prescription, and can be reached at [phone]

## 2018-06-06 ENCOUNTER — TELEPHONE (OUTPATIENT)
Dept: INTERNAL MEDICINE | Facility: CLINIC | Age: 68
End: 2018-06-06

## 2018-06-06 DIAGNOSIS — I10 ESSENTIAL HYPERTENSION: ICD-10-CM

## 2018-06-06 RX ORDER — HYDROCHLOROTHIAZIDE 12.5 MG/1
12.5 TABLET ORAL DAILY
Qty: 90 TABLET | Refills: 1 | Status: SHIPPED | OUTPATIENT
Start: 2018-06-06 | End: 2018-11-14 | Stop reason: SDUPTHER

## 2018-06-06 NOTE — TELEPHONE ENCOUNTER
----- Message from Yara Mandel sent at 6/6/2018 11:18 AM CDT -----  Patient requesting a 90 day Rx refill for hydroCHLOROthiazide.    Pt use..    [facility] - [place], [place] - [address]  [address]  [facility] [address]  Phone: [phone] Fax: [phone]    Please adv/call [phone].//cw

## 2018-06-06 NOTE — TELEPHONE ENCOUNTER
----- Message from Jesusita Alexandre sent at 6/6/2018 12:37 PM CDT -----  Contact: pt   Pt states that his med went to the wrong Pharmacy. Med went to [facility] when it needs to go to [facility].   .[phone] (home)       .  [facility]

## 2018-06-06 NOTE — TELEPHONE ENCOUNTER
Spoke to pt. Informed pt that his medication was sent to [facility] last month with a 9day and 1 refill. I told him to call his pharmacy  And ask them to send it to him. Pt verbalized understanding

## 2018-06-06 NOTE — TELEPHONE ENCOUNTER
The pt states he doesn't use [facility]. [facility]. Please advise     lv-5/18/18

## 2018-06-06 NOTE — TELEPHONE ENCOUNTER
----- Message from Jesusita Alexandre sent at 6/6/2018  7:16 AM CDT -----  Contact: pt   1. What is the name of the medication you are requesting? hydroCHLOROthiazide (HYDRODIURIL)   2. What is the dose? 12.5 MG Tab  3. How do you take the medication? Orally, topically, etc? orally  4. How often do you take this medication?   5. Do you need a 30 day or 90 day supply? 30  6. How many refills are you requesting? 1  7. What is your preferred pharmacy and location of the pharmacy?   [facility] - [place], [place] - [address] [facility]  [address] [facility] [address]  Phone: [phone] Fax: [phone]  8. Who can we contact with further questions? the patient

## 2018-06-20 ENCOUNTER — OFFICE VISIT (OUTPATIENT)
Dept: FAMILY MEDICINE | Facility: CLINIC | Age: 68
End: 2018-06-20
Payer: MEDICARE

## 2018-06-20 VITALS
DIASTOLIC BLOOD PRESSURE: 80 MMHG | HEIGHT: 71 IN | WEIGHT: 231.81 LBS | HEART RATE: 60 BPM | TEMPERATURE: 98 F | RESPIRATION RATE: 17 BRPM | SYSTOLIC BLOOD PRESSURE: 120 MMHG | OXYGEN SATURATION: 97 % | BODY MASS INDEX: 32.45 KG/M2

## 2018-06-20 DIAGNOSIS — N21.0 BLADDER CALCULUS: Primary | ICD-10-CM

## 2018-06-20 DIAGNOSIS — Z01.818 PREOP EXAMINATION: ICD-10-CM

## 2018-06-20 DIAGNOSIS — E66.9 OBESITY (BMI 30-39.9): ICD-10-CM

## 2018-06-20 DIAGNOSIS — N18.31 CHRONIC KIDNEY DISEASE (CKD) STAGE G3A/A1, MODERATELY DECREASED GLOMERULAR FILTRATION RATE (GFR) BETWEEN 45-59 ML/MIN/1.73 SQUARE METER AND ALBUMINURIA CREATININE RATIO LESS THAN 30 MG/G: ICD-10-CM

## 2018-06-20 DIAGNOSIS — J61 PULMONARY ASBESTOSIS: ICD-10-CM

## 2018-06-20 DIAGNOSIS — J44.9 COPD, MILD: ICD-10-CM

## 2018-06-20 DIAGNOSIS — I70.0 AORTIC ATHEROSCLEROSIS: ICD-10-CM

## 2018-06-20 DIAGNOSIS — Z86.73 HISTORY OF CVA (CEREBROVASCULAR ACCIDENT): ICD-10-CM

## 2018-06-20 DIAGNOSIS — I51.89 DIASTOLIC DYSFUNCTION: ICD-10-CM

## 2018-06-20 DIAGNOSIS — G47.30 SLEEP APNEA, UNSPECIFIED TYPE: ICD-10-CM

## 2018-06-20 DIAGNOSIS — I10 ESSENTIAL HYPERTENSION: ICD-10-CM

## 2018-06-20 DIAGNOSIS — R73.03 PREDIABETES: ICD-10-CM

## 2018-06-20 PROCEDURE — 3074F SYST BP LT 130 MM HG: CPT | Mod: CPTII,S$GLB,, | Performed by: FAMILY MEDICINE

## 2018-06-20 PROCEDURE — 99999 PR PBB SHADOW E&M-EST. PATIENT-LVL III: CPT | Mod: PBBFAC,,, | Performed by: FAMILY MEDICINE

## 2018-06-20 PROCEDURE — 3079F DIAST BP 80-89 MM HG: CPT | Mod: CPTII,S$GLB,, | Performed by: FAMILY MEDICINE

## 2018-06-20 PROCEDURE — 99214 OFFICE O/P EST MOD 30 MIN: CPT | Mod: S$GLB,,, | Performed by: FAMILY MEDICINE

## 2018-06-20 NOTE — PROGRESS NOTES
Subjective:       Patient ID: Monster Clinton is a 68 y.o. male.    Chief Complaint: Pre-op Exam    HPI   Pre-Op Exam  67yo male presents today for preoperative examination. He will be undergoing cystolithilopaxy per Dr. Jas Olguin- date is to be determined. Patient is reporting intermittent hematuria. There is some accompanying low back pain- he is not currently taking any pain medication. In 2016 patient had assessment per Cardiology which included stress testing with negative results. Echocardiogram was consistent with diastolic dysfunction. He denies any CP or palpitations. He is followed by Pulmonology for COPD and asbestosis. He is overdue for follow-up. No excessive use of albuterol is reported. There is no report of DVT or PE. No adverse effects of anesthesia are reported. He denies any symptoms of concern for infection but admits he is currently on ABX (Cipro) as well as Pyridium per Urology. There have been no recent ER visits or hospitalizations.    Past Medical History:   Diagnosis Date    Hypertension     Prediabetes     Pulmonary asbestosis     Sleep apnea     Stroke      Past Surgical History:   Procedure Laterality Date    HERNIA REPAIR      PROSTATE SURGERY      Right Knee Arthroscopy  2018     Family History   Problem Relation Age of Onset    Cancer Mother     Heart disease Father      Review of Systems   Constitutional: Negative for chills, fatigue and unexpected weight change.   HENT: Negative for congestion, rhinorrhea and sore throat.    Eyes: Negative for visual disturbance.   Respiratory: Negative for cough and shortness of breath.    Cardiovascular: Negative for chest pain, palpitations and leg swelling.   Gastrointestinal: Negative for constipation and diarrhea.   Endocrine: Negative for cold intolerance and heat intolerance.   Genitourinary: Negative for decreased urine volume, difficulty urinating and dysuria.   Musculoskeletal: Positive for back pain. Negative for arthralgias and  "myalgias.   Skin: Negative for rash.   Allergic/Immunologic: Negative for environmental allergies.   Neurological: Negative for dizziness and headaches.   Psychiatric/Behavioral: Negative for sleep disturbance. The patient is not nervous/anxious.        Objective:   /80   Pulse 60   Temp 97.6 °F (36.4 °C) (Temporal)   Resp 17   Ht 5' 11" (1.803 m)   Wt 105.2 kg (231 lb 13 oz)   SpO2 97%   BMI 32.33 kg/m²   Physical Exam   Constitutional: He is oriented to person, place, and time. He appears well-developed and well-nourished. No distress.   Obese, non-toxic   HENT:   Head: Normocephalic and atraumatic.   Right Ear: Tympanic membrane, external ear and ear canal normal.   Left Ear: Tympanic membrane, external ear and ear canal normal.   Nose: Nose normal.   Mouth/Throat: Oropharynx is clear and moist.   Eyes: Conjunctivae and EOM are normal. Pupils are equal, round, and reactive to light.   Neck: Normal range of motion. Neck supple.   Cardiovascular: Normal rate, regular rhythm and normal heart sounds.    Pulmonary/Chest: Effort normal and breath sounds normal.   Abdominal: Soft. Bowel sounds are normal. There is no tenderness.   Musculoskeletal: He exhibits no edema.   Neurological: He is alert and oriented to person, place, and time.   Skin: Skin is warm and dry. He is not diaphoretic.   Psychiatric: He has a normal mood and affect. His behavior is normal.       Assessment:       1. Bladder calculus    2. Essential hypertension    3. Diastolic dysfunction    4. COPD, mild    5. Pulmonary asbestosis    6. Sleep apnea, unspecified type    7. Aortic atherosclerosis    8. Prediabetes    9. Chronic kidney disease (CKD) stage G3a/A1, moderately decreased glomerular filtration rate (GFR) between 45-59 mL/min/1.73 square meter and albuminuria creatinine ratio less than 30 mg/g    10. Obesity (BMI 30-39.9)    11. History of CVA (cerebrovascular accident)    12. Preop examination        Plan:      Bladder " calculus  Patient has been medically optimized for the proposed surgical intervention and may proceed as scheduled. He is at low to intermediate risk from a medical standpoint. Any test results should be separately considered by the requesting surgeon and anesthesia team prior to surgery. Will forward documents to the office of Dr. Olguin. Copies will be provided to the patient.  Essential hypertension  Stable. Continuation of current treatment is advised.  Diastolic dysfunction  Noted on echocardiogram in 2016- current echocardiogram demonstrates indeterminate LV diastolic dysfunction.  -     2D echo with color flow doppler; Future  COPD, mild  Patient will need to see Pulmonology for perioperative risk stratification.  Pulmonary asbestosis  As above.  Sleep apnea, unspecified type  As above.  Aortic atherosclerosis  BP and lipid control remain advised.  Prediabetes  Stable per most recent assessment and no s/s hyper or hypoglycemia.  Chronic kidney disease (CKD) stage G3a/A1, moderately decreased glomerular filtration rate (GFR) between 45-59 mL/min/1.73 square meter and albuminuria creatinine ratio less than 30 mg/g  Refrain from NSAID medication use and continue with medications for BP control.  Obesity (BMI 30-39.9)  Weight loss efforts are encouraged.  History of CVA (cerebrovascular accident)  No residual deficits.  Preop examination  As above.

## 2018-06-22 ENCOUNTER — TELEPHONE (OUTPATIENT)
Dept: FAMILY MEDICINE | Facility: CLINIC | Age: 68
End: 2018-06-22

## 2018-06-22 ENCOUNTER — TELEPHONE (OUTPATIENT)
Dept: PULMONOLOGY | Facility: CLINIC | Age: 68
End: 2018-06-22

## 2018-06-22 NOTE — TELEPHONE ENCOUNTER
Spoke to pt. Pt needed to reschedule his appt with pulm. For a pre op. Pt verbalized understanding

## 2018-06-22 NOTE — TELEPHONE ENCOUNTER
----- Message from Francisca Doe sent at 6/22/2018 10:02 AM CDT -----  Pt at [phone]//states he is needing to speak with your office regarding the Pulm Dr that he needs to see before his surgery///please call to discuss//radha/gurvinder

## 2018-06-25 ENCOUNTER — TELEPHONE (OUTPATIENT)
Dept: PULMONOLOGY | Facility: CLINIC | Age: 68
End: 2018-06-25

## 2018-06-25 ENCOUNTER — HOSPITAL ENCOUNTER (OUTPATIENT)
Dept: RADIOLOGY | Facility: HOSPITAL | Age: 68
Discharge: HOME | End: 2018-06-25
Attending: INTERNAL MEDICINE
Payer: MEDICARE

## 2018-06-25 ENCOUNTER — TELEPHONE (OUTPATIENT)
Dept: FAMILY MEDICINE | Facility: CLINIC | Age: 68
End: 2018-06-25

## 2018-06-25 ENCOUNTER — OFFICE VISIT (OUTPATIENT)
Dept: PULMONOLOGY | Facility: CLINIC | Age: 68
End: 2018-06-25
Payer: MEDICARE

## 2018-06-25 ENCOUNTER — CLINICAL SUPPORT (OUTPATIENT)
Dept: CARDIOLOGY | Facility: CLINIC | Age: 68
End: 2018-06-25
Attending: FAMILY MEDICINE
Payer: MEDICARE

## 2018-06-25 VITALS
BODY MASS INDEX: 31.52 KG/M2 | WEIGHT: 225.19 LBS | DIASTOLIC BLOOD PRESSURE: 70 MMHG | RESPIRATION RATE: 19 BRPM | OXYGEN SATURATION: 96 % | HEART RATE: 64 BPM | SYSTOLIC BLOOD PRESSURE: 102 MMHG | HEIGHT: 71 IN

## 2018-06-25 DIAGNOSIS — Z01.811 PRE-OPERATIVE RESPIRATORY EXAMINATION: Primary | ICD-10-CM

## 2018-06-25 DIAGNOSIS — J44.9 CHRONIC OBSTRUCTIVE PULMONARY DISEASE, UNSPECIFIED COPD TYPE: ICD-10-CM

## 2018-06-25 DIAGNOSIS — R06.02 SOB (SHORTNESS OF BREATH): Primary | ICD-10-CM

## 2018-06-25 DIAGNOSIS — I51.89 DIASTOLIC DYSFUNCTION: ICD-10-CM

## 2018-06-25 DIAGNOSIS — R06.02 SOB (SHORTNESS OF BREATH): ICD-10-CM

## 2018-06-25 DIAGNOSIS — J61 PULMONARY ASBESTOSIS: ICD-10-CM

## 2018-06-25 LAB
ESTIMATED PA SYSTOLIC PRESSURE: 23.04
RETIRED EF AND QEF - SEE NOTES: 55 (ref 55–65)

## 2018-06-25 PROCEDURE — 3074F SYST BP LT 130 MM HG: CPT | Mod: CPTII,S$GLB,, | Performed by: INTERNAL MEDICINE

## 2018-06-25 PROCEDURE — 99215 OFFICE O/P EST HI 40 MIN: CPT | Mod: S$GLB,,, | Performed by: INTERNAL MEDICINE

## 2018-06-25 PROCEDURE — 99999 PR PBB SHADOW E&M-EST. PATIENT-LVL III: CPT | Mod: PBBFAC,,, | Performed by: INTERNAL MEDICINE

## 2018-06-25 PROCEDURE — 71048 X-RAY EXAM CHEST 4+ VIEWS: CPT | Mod: 26,,, | Performed by: RADIOLOGY

## 2018-06-25 PROCEDURE — 3078F DIAST BP <80 MM HG: CPT | Mod: CPTII,S$GLB,, | Performed by: INTERNAL MEDICINE

## 2018-06-25 PROCEDURE — 71048 X-RAY EXAM CHEST 4+ VIEWS: CPT | Mod: TC

## 2018-06-25 PROCEDURE — 93306 TTE W/DOPPLER COMPLETE: CPT | Mod: S$GLB,,, | Performed by: NUCLEAR MEDICINE

## 2018-06-25 NOTE — TELEPHONE ENCOUNTER
Pt contacted to reschedule PreOp appointment d/t need for PreOp testing prior to office visit. Pt stated that he would be keeping his appointment d/t kidney stone removal surgery is on Wednesday. Pt informed that staff would schedule testing as available and would leave any additional testing to the discretion of provider. Pt informed of the possibility of additional follow up appointment with coresponding  co-pay. Pt verbalized understanding.

## 2018-06-25 NOTE — PROGRESS NOTES
Subjective:       Patient ID: Monster Clinton is a 68 y.o. male.    Chief Complaint: He       Follow-up (PreOp Clearance)    HPI     Preop Evaluation  Removal of kidney stones    Dyspnea  Patient complains of shortness of breath. Symptoms occur after more than one flight stairs, with more than one block walking. Symptoms began 5 years ago, unchanged since. Associated symptoms include  shortness of breath. He denies chest pain, located left chest. He does not have had recent travel. Weight has been stable. Symptoms are exacerbated by strenuous activity. Symptoms are alleviated by rest.   Uses inhalers intermittently.      Past Medical History:   Diagnosis Date    Hypertension     Prediabetes     Pulmonary asbestosis     Sleep apnea     Stroke      Past Surgical History:   Procedure Laterality Date    HERNIA REPAIR      PROSTATE SURGERY      Right Knee Arthroscopy  2018     Social History     Social History    Marital status:      Spouse name: N/A    Number of children: N/A    Years of education: N/A     Occupational History    Not on file.     Social History Main Topics    Smoking status: Former Smoker     Quit date: 12/12/1978    Smokeless tobacco: Never Used    Alcohol use No    Drug use: No    Sexual activity: Not Currently     Other Topics Concern    Not on file     Social History Narrative    No narrative on file     Review of Systems   Constitutional: Negative for fever and fatigue.   HENT: Negative for postnasal drip and rhinorrhea.    Eyes: Negative for redness and itching.   Respiratory: Positive for dyspnea on extertion. Negative for cough, shortness of breath, wheezing and Paroxysmal Nocturnal Dyspnea.    Cardiovascular: Negative for chest pain.   Genitourinary: Negative for difficulty urinating and hematuria.   Endocrine: Negative for polyphagia, cold intolerance and heat intolerance.    Musculoskeletal: Positive for back pain. Negative for arthralgias.   Skin: Negative for rash.    Gastrointestinal: Negative for nausea, vomiting, abdominal pain and abdominal distention.   Neurological: Negative for dizziness and headaches.   Hematological: Negative for adenopathy. Does not bruise/bleed easily and no excessive bruising.   Psychiatric/Behavioral: The patient is not nervous/anxious.        Objective:      Physical Exam   Constitutional: He is oriented to person, place, and time. He appears well-developed and well-nourished.   HENT:   Head: Normocephalic and atraumatic.   Mouth/Throat: Oropharyngeal exudate present.   Eyes: Conjunctivae are normal. Pupils are equal, round, and reactive to light.   Neck: Neck supple. No JVD present. No tracheal deviation present. No thyromegaly present.   Cardiovascular: Normal rate, regular rhythm and normal heart sounds.    No murmur heard.  Pulmonary/Chest: Effort normal. No respiratory distress. He has decreased breath sounds. He has no wheezes. He has no rhonchi. He has rales in the right lower field and the left lower field. He exhibits no tenderness.   Abdominal: Soft. Bowel sounds are normal.   Musculoskeletal: Normal range of motion. He exhibits no edema or tenderness.   Lymphadenopathy:     He has no cervical adenopathy.   Neurological: He is alert and oriented to person, place, and time.   Skin: Skin is warm and dry.   Nursing note and vitals reviewed.    Personal Diagnostic Review  Chest x-ray: stable  Office Spirometry Results:      Radiology Result      Name:   :  Patient MRN:   Monster Clinton 1950 [id number]   Account Number: Room & Bed Accession Number:   [id number]   [id number]   Authorizing Physician: Patient Class: Diagnosis:   Mark Izaguirre OP- Outpatient Diagnostic Testing SOB (shortness of breath) [R06.02 (ICD-10-CM)]   Procedure:  Exam Date: Reason for Exam:   X-Ray Chest 4 Or More View 2018 None Specified             RESULTS:  EXAMINATION:  XR CHEST 4 OR MORE VIEW     CLINICAL HISTORY:  Shortness of breath     TECHNIQUE:  PA,  lateral, and bilateral oblique views of the chest were obtained.     COMPARISON:  12/19/2016     FINDINGS:  The cardiac and mediastinal silhouettes are within normal limits. The lungs are clear bilaterally. No definite calcified pleural plaque demonstrated.  Multilevel degenerative findings noted throughout the visualized spine.     IMPRESSION:      No acute findings.        Electronically signed by: Wisam Lafleur MD  Date:                                            06/25/2018  Time:                                           11:39                    Signed By:  Wisam Lafleur MD on 6/25/2018 11:39 AM       No flowsheet data found.  Pulmonary Studies Review 6/25/2018   SpO2 96   Ordering Provider -   Interpreting Provider -   Performing nurse/tech/RT -   Diagnosis -   Height 71.000   Weight 3603.2   BMI (Calculated) 31.5   Predicted Distance 324.49   Patient Race -   6MWT Status -   Patient Reported -   Was O2 used? -   Did patient stop? -   Type of assistive device(s) used? -   Is extra documentation required for this patient? -   Oxygen Saturation -   Supplemental Oxygen -   Heart Rate -   Blood Pressure -   Kelly Dyspnea Rating  -   Oxygen Saturation -   Supplemental Oxygen -   Heart Rate -   Blood Pressure -   Kelly Dyspnea Rating  -   Recovery Time (seconds) -   Oxygen Saturation -   Supplemental Oxygen -   Heart Rate -   Blood Pressure -   Kelly Dyspnea Rating  -   Is procedure ready for interpretation? -   Did the patient stop or pause? -   Total Time Walked (Calculated) -   Total Laps Walked -   Final Partial Lap Distance (feet) -   Total Distance Feet (Calculated) -   Total Distance Meters (Calculated) -   Predicted Distance Meters (Calculated) 524.69   Percentage of Predicted (Calculated) -   Peak VO2 (Calculated) -   Mets -   Has The Patient Had a Previous Six Minute Walk Test? -   Oxygen Qualification? -         Assessment:       1. Pre-operative respiratory examination    2. Pulmonary asbestosis     3. Chronic obstructive pulmonary disease, unspecified COPD type        Outpatient Encounter Prescriptions as of 6/25/2018   Medication Sig Dispense Refill    albuterol (VENTOLIN HFA) 90 mcg/actuation inhaler Inhale 2 puffs into the lungs every 4 (four) hours as needed for Wheezing or Shortness of Breath. 1 Inhaler 5    amlodipine-benazepril 5-20 mg (LOTREL) 5-20 mg per capsule Take 1 capsule by mouth once daily. 90 capsule 1    finasteride (PROSCAR) 5 mg tablet Take 5 mg by mouth once daily.      hydroCHLOROthiazide (HYDRODIURIL) 12.5 MG Tab Take 1 tablet (12.5 mg total) by mouth once daily. 90 tablet 1    metoprolol tartrate (LOPRESSOR) 50 MG tablet Take 1 tablet (50 mg total) by mouth once daily. 90 tablet 1    mometasone-formoterol (DULERA) 100-5 mcg/actuation HFAA Inhale into the lungs. Controller      multivitamin capsule Take 1 capsule by mouth once daily.      atorvastatin (LIPITOR) 20 MG tablet Take 1 tablet (20 mg total) by mouth once daily. 90 tablet 1    triamcinolone acetonide 0.1% (KENALOG) 0.1 % cream        No facility-administered encounter medications on file as of 6/25/2018.      Orders Placed This Encounter   Procedures    X-Ray Chest 4 Or More View     Standing Status:   Future     Standing Expiration Date:   12/25/2019     Order Specific Question:   Reason for Exam:     Answer:   asbestosis    Complete PFT with bronchodilator     Standing Status:   Future     Standing Expiration Date:   12/25/2019     Plan:       Requested Prescriptions      No prescriptions requested or ordered in this encounter     Pre-operative respiratory examination    Pulmonary asbestosis  -     Complete PFT with bronchodilator; Future; Expected date: 06/25/2018  -     X-Ray Chest 4 Or More View; Future; Expected date: 06/25/2018    Chronic obstructive pulmonary disease, unspecified COPD type  -     Complete PFT with bronchodilator; Future; Expected date: 06/25/2018  -     X-Ray Chest 4 Or More View; Future;  Expected date: 06/25/2018           No Follow-up on file.    Clearance for surgery:  The patient is at an increased risk of complications from surgery due to multiple medical problems including asbestos exposure and COPD. Vaccination status reviewed and updated. Risks of possible complications of surgery discussed in detail including risk of respiratory failure requiring mechanical ventilation, post operative pneumonia, deep venous thrombosis, pulmonary embolism and death.  Pulmonary function studies, arterial blood gases and chest X ray reports are independently reviewed. Methods of improving lung function prior to surgery including incentive spirometry, regular use of bronchodilators, exercise and respiratory toilet discussed. Patient voices understanding of increased risks involved due to compromised pulmonary status and the need to comply with treatment plan prior to surgery.  The patient is cleared for anesthesia and surgery from a pulmonary standpoint.        MEDICAL DECISION MAKING: Moderate to high complexity.  Overall, the multiple problems listed are of moderate to high severity that may impact quality of life and activities of daily living. Side effects of medications, treatment plan as well as options and alternatives reviewed and discussed with patient. There was counseling of patient concerning these issues.    Total time spent in face to face counseling and coordination of care - 40  minutes over 50% of time was used in discussion of prognosis, risks, benefits of treatment, instructions and compliance with regimen . Discussion with other physicians or health care providers (DME, NP, pharmacy, respiratory therapy) occurred.

## 2018-06-25 NOTE — LETTER
June 25, 2018      Raymond Olguin III, MD  [address] [facility]  [address]  [facility], [facility] [address]           [facility]  [id number] [place] [address]  Phone: [phone]  Fax: [phone]          Patient: Monster Clinton   MR Number: [id number]   YOB: 1950   Date of Visit: 6/25/2018       Dear No ref. provider found:    Thank you for referring Monster Clinton to me for evaluation. Attached you will find relevant portions of my assessment and plan of care.    If you have questions, please do not hesitate to call me. I look forward to following Monster Clinton along with you.    Sincerely,    Mark Izaguirre MD    Enclosure  CC:  No Recipients    IIf you would like to receive this communication electronically, please contact [email or web address] or [phone] to request Medicine in Practice Link access.    Medicine in Practice Link is a tool which provides read-only access to select patient information with whom you have a relationship. Its easy to use and provides real time access to review your patients record including encounter summaries, notes, results, and demographic information.    If you feel you have received this communication in error or would no longer like to receive these types of communications, please e-mail [email or web address]

## 2018-06-26 ENCOUNTER — CLINICAL SUPPORT (OUTPATIENT)
Dept: CARDIOLOGY | Facility: CLINIC | Age: 68
End: 2018-06-26
Payer: MEDICARE

## 2018-06-26 ENCOUNTER — OFFICE VISIT (OUTPATIENT)
Dept: CARDIOLOGY | Facility: CLINIC | Age: 68
End: 2018-06-26
Payer: MEDICARE

## 2018-06-26 ENCOUNTER — TELEPHONE (OUTPATIENT)
Dept: FAMILY MEDICINE | Facility: CLINIC | Age: 68
End: 2018-06-26

## 2018-06-26 ENCOUNTER — TELEPHONE (OUTPATIENT)
Dept: CARDIOLOGY | Facility: CLINIC | Age: 68
End: 2018-06-26

## 2018-06-26 VITALS
SYSTOLIC BLOOD PRESSURE: 108 MMHG | WEIGHT: 203 LBS | BODY MASS INDEX: 28.42 KG/M2 | HEART RATE: 59 BPM | HEIGHT: 71 IN | DIASTOLIC BLOOD PRESSURE: 72 MMHG

## 2018-06-26 DIAGNOSIS — I10 ESSENTIAL HYPERTENSION: ICD-10-CM

## 2018-06-26 DIAGNOSIS — Z01.818 PRE-OP EVALUATION: ICD-10-CM

## 2018-06-26 DIAGNOSIS — R06.83 PRIMARY SNORING: ICD-10-CM

## 2018-06-26 DIAGNOSIS — R73.03 PREDIABETES: ICD-10-CM

## 2018-06-26 DIAGNOSIS — Z01.818 PRE-OP EVALUATION: Primary | ICD-10-CM

## 2018-06-26 DIAGNOSIS — Z86.73 HISTORY OF CVA (CEREBROVASCULAR ACCIDENT): ICD-10-CM

## 2018-06-26 DIAGNOSIS — N18.31 CHRONIC KIDNEY DISEASE (CKD) STAGE G3A/A1, MODERATELY DECREASED GLOMERULAR FILTRATION RATE (GFR) BETWEEN 45-59 ML/MIN/1.73 SQUARE METER AND ALBUMINURIA CREATININE RATIO LESS THAN 30 MG/G: ICD-10-CM

## 2018-06-26 DIAGNOSIS — E66.9 OBESITY (BMI 30-39.9): ICD-10-CM

## 2018-06-26 DIAGNOSIS — J44.9 CHRONIC OBSTRUCTIVE PULMONARY DISEASE, UNSPECIFIED COPD TYPE: ICD-10-CM

## 2018-06-26 DIAGNOSIS — Z87.891 HISTORY OF SMOKING: ICD-10-CM

## 2018-06-26 DIAGNOSIS — Z87.442 HISTORY OF NEPHROLITHIASIS: ICD-10-CM

## 2018-06-26 PROCEDURE — 3078F DIAST BP <80 MM HG: CPT | Mod: CPTII,S$GLB,, | Performed by: PHYSICIAN ASSISTANT

## 2018-06-26 PROCEDURE — 99213 OFFICE O/P EST LOW 20 MIN: CPT | Mod: S$GLB,,, | Performed by: PHYSICIAN ASSISTANT

## 2018-06-26 PROCEDURE — 99999 PR PBB SHADOW E&M-EST. PATIENT-LVL III: CPT | Mod: PBBFAC,,, | Performed by: PHYSICIAN ASSISTANT

## 2018-06-26 PROCEDURE — 3074F SYST BP LT 130 MM HG: CPT | Mod: CPTII,S$GLB,, | Performed by: PHYSICIAN ASSISTANT

## 2018-06-26 PROCEDURE — 93000 ELECTROCARDIOGRAM COMPLETE: CPT | Mod: S$GLB,,, | Performed by: INTERNAL MEDICINE

## 2018-06-26 NOTE — PROGRESS NOTES
Subjective:    Patient ID:  Monster Clinton is a 68 y.o. male who presents for follow-up of Pre-op Exam      HPI   Mr. Clinton is a 68 year old male patient with a PMHx of asbestosis, CVA, former tobacco abuse, prediabetes, CKD, and obesity who presents today for pre-op clearance. Patient is scheduled for cystolitholapaxy by Dr. Jas Olguin in the near future. Last seen by Dr. Peraza in 2016. He returns today and states he is doing well. No cardiac complaints. Denies any chest pain, SOB, or other anginal signs or symptoms. Chronic PANCHAL when he does too much which is his jose. No lightheadedness, palpitations, dizziness, near syncope, or syncope. No s/s of CHF. No claudication. Patient reports compliance with his medications. Stays active with work. Recently had right knee laparoscopic procedure without any complications. 2D echo 6/25/18 showed normal EF, no WMA. Stress test in 2016 negative. EKG today shows sinus bradycardia, other normal EKG.      Review of Systems   Constitution: Negative for chills, decreased appetite, fever, weakness and malaise/fatigue.   HENT: Negative for congestion, hoarse voice and sore throat.    Eyes: Negative for blurred vision and discharge.   Cardiovascular: Negative for chest pain, claudication, cyanosis, dyspnea on exertion, irregular heartbeat, leg swelling, near-syncope, orthopnea, palpitations and paroxysmal nocturnal dyspnea.   Respiratory: Negative for cough, hemoptysis, shortness of breath, snoring, sputum production and wheezing.    Endocrine: Negative for cold intolerance and heat intolerance.   Hematologic/Lymphatic: Negative for bleeding problem. Does not bruise/bleed easily.   Skin: Negative for rash.   Musculoskeletal: Negative for arthritis, back pain, joint pain, joint swelling, muscle cramps, muscle weakness and myalgias.   Gastrointestinal: Negative for abdominal pain, constipation, diarrhea, heartburn, melena and nausea.   Genitourinary: Negative for hematuria.  "  Neurological: Negative for dizziness, focal weakness, headaches, light-headedness, loss of balance, numbness, paresthesias and seizures.   Psychiatric/Behavioral: Negative for memory loss. The patient does not have insomnia.    Allergic/Immunologic: Negative for hives.       /72   Pulse (!) 59   Ht 5' 11" (1.803 m)   Wt 92.1 kg (203 lb)   BMI 28.31 kg/m²     Objective:    Physical Exam   Constitutional: He is oriented to person, place, and time. He appears well-developed and well-nourished. No distress.   HENT:   Head: Normocephalic and atraumatic.   Eyes: Pupils are equal, round, and reactive to light. Right eye exhibits no discharge. Left eye exhibits no discharge.   Neck: Neck supple. No JVD present. No thyromegaly present.   Cardiovascular: Normal rate, regular rhythm, S1 normal, S2 normal and normal heart sounds.    No murmur heard.  Pulmonary/Chest: Effort normal and breath sounds normal. No respiratory distress. He has no wheezes. He has no rales.   Coarse BS at bases   Abdominal: Soft. He exhibits no distension. There is no tenderness. There is no rebound.   Musculoskeletal: He exhibits no edema.   Neurological: He is alert and oriented to person, place, and time.   Skin: Skin is warm and dry. He is not diaphoretic. No erythema.   Psychiatric: He has a normal mood and affect. His behavior is normal. Thought content normal.   Nursing note and vitals reviewed.      2D Echo CONCLUSIONS     1 - Concentric hypertrophy.     2 - LEFT ATRIAL ENLARGEMENT.     3 - No wall motion abnormalities.     4 - Normal left ventricular systolic function (EF 55-60%).     5 - Indeterminate LV diastolic function.     6 - Normal right ventricular systolic function .     Impression: NORMAL MYOCARDIAL PERFUSION  1. The perfusion scan is free of evidence for myocardial ischemia or injury.   2. Resting wall motion is physiologic.   3. Resting LV function is normal.   4. The ventricular volumes are normal at rest and stress. "   5. The extracardiac distribution of radioactivity is normal.   Assessment:       1. Pre-op evaluation    2. Primary snoring    3. History of smoking    4. Obesity (BMI 30-39.9)    5. Prediabetes    6. Chronic kidney disease (CKD) stage G3a/A1, moderately decreased glomerular filtration rate (GFR) between 45-59 mL/min/1.73 square meter and albuminuria creatinine ratio less than 30 mg/g    7. History of nephrolithiasis    8. Essential hypertension    9. Chronic obstructive pulmonary disease, unspecified COPD type    10. History of CVA (cerebrovascular accident)       Doing clinically well. No cardiac complaints. No angina or equivalent. Cleared by pulmonary. 2D echo showed normal EF. Previous surgery in past two weeks without complications. Ok to proceed at low risk of terrence and post op CV complications.   Plan:   -Continue current medical management and risk factor modification  -Cardiac, low salt diet  -Ok to proceed with surgery at low risk of terrence and post OP CV complications  -RTC prn    Chart reviewed. Dr. Jones agrees with plan as outlined above.

## 2018-06-26 NOTE — TELEPHONE ENCOUNTER
----- Message from Maryan Elizabeth PA-C sent at 6/26/2018 10:22 AM CDT -----  Please fax copy of my office note to Dr. Jas Olguin's office regarding surgical clearance    Thanks

## 2018-08-28 ENCOUNTER — TELEPHONE (OUTPATIENT)
Dept: RADIOLOGY | Facility: HOSPITAL | Age: 68
End: 2018-08-28

## 2018-10-17 ENCOUNTER — TELEPHONE (OUTPATIENT)
Dept: RADIOLOGY | Facility: HOSPITAL | Age: 68
End: 2018-10-17

## 2018-11-14 DIAGNOSIS — I10 ESSENTIAL HYPERTENSION: ICD-10-CM

## 2018-11-15 RX ORDER — HYDROCHLOROTHIAZIDE 12.5 MG/1
TABLET ORAL
Qty: 90 TABLET | Refills: 0 | Status: SHIPPED | OUTPATIENT
Start: 2018-11-15 | End: 2019-02-13 | Stop reason: SDUPTHER

## 2019-02-13 ENCOUNTER — TELEPHONE (OUTPATIENT)
Dept: INTERNAL MEDICINE | Facility: CLINIC | Age: 69
End: 2019-02-13

## 2019-02-13 DIAGNOSIS — I10 ESSENTIAL HYPERTENSION: ICD-10-CM

## 2019-02-13 RX ORDER — HYDROCHLOROTHIAZIDE 12.5 MG/1
TABLET ORAL
Qty: 90 TABLET | Refills: 0 | Status: SHIPPED | OUTPATIENT
Start: 2019-02-13

## 2019-05-13 DIAGNOSIS — I10 ESSENTIAL HYPERTENSION: ICD-10-CM

## 2019-05-14 RX ORDER — HYDROCHLOROTHIAZIDE 12.5 MG/1
TABLET ORAL
Qty: 90 TABLET | Refills: 0 | OUTPATIENT
Start: 2019-05-14

## 2023-04-22 ENCOUNTER — HOSPITAL ENCOUNTER (EMERGENCY)
Facility: HOSPITAL | Age: 73
Discharge: HOME | End: 2023-04-22
Attending: EMERGENCY MEDICINE
Payer: MEDICARE

## 2023-04-22 VITALS
BODY MASS INDEX: 33.43 KG/M2 | RESPIRATION RATE: 20 BRPM | SYSTOLIC BLOOD PRESSURE: 174 MMHG | TEMPERATURE: 98 F | DIASTOLIC BLOOD PRESSURE: 82 MMHG | WEIGHT: 238.75 LBS | HEART RATE: 53 BPM | HEIGHT: 71 IN | OXYGEN SATURATION: 97 %
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
## 2023-04-22 DIAGNOSIS — S09.90XA INJURY OF HEAD, INITIAL ENCOUNTER: ICD-10-CM

## 2023-04-22 PROCEDURE — 99284 EMERGENCY DEPT VISIT MOD MDM: CPT | Mod: 25

## 2023-04-22 RX ORDER — ONDANSETRON 4 MG/1
4 TABLET, ORALLY DISINTEGRATING ORAL
Status: DISCONTINUED | OUTPATIENT
Start: 2023-04-22 | End: 2023-04-22 | Stop reason: HOSPADM

## 2023-04-22 RX ORDER — MORPHINE SULFATE 4 MG/ML
4 INJECTION, SOLUTION INTRAMUSCULAR; INTRAVENOUS
Status: DISCONTINUED | OUTPATIENT
Start: 2023-04-22 | End: 2023-04-22 | Stop reason: HOSPADM

## 2023-04-22 NOTE — ED PROVIDER NOTES
HISTORY     Chief Complaint   Patient presents with    Fall     Pt was moving large equipment and fell onto the front of his truck hitting the bumper. Hit head, no loss of consciousness, no blood thinners, reports nausea.     Review of patient's allergies indicates:   Allergen Reactions    Ibuprofen Anaphylaxis        HPI   The history is provided by the patient. No  was used.   Head Injury   The incident occurred just prior to arrival. He came to the ER via by private vehicle. The injury mechanism was a fall. He lost consciousness for a period of seconds. The pain is at a severity of 4/10. The pain has been constant since the injury. Associated symptoms include disorientation (resolved). Pertinent negatives include no numbness, no blurred vision, no vomiting, no tinnitus and no weakness.    Patient reports he was pushing a connor when he tripped and he fell into his truck striking the back of his head and the connor struck him in the nose.  PCP: Faith Baugh MD     Past Medical History:  Past Medical History:   Diagnosis Date    Hypertension     Prediabetes     Pulmonary asbestosis     Sleep apnea     Stroke         Past Surgical History:  Past Surgical History:   Procedure Laterality Date    HERNIA REPAIR      PROSTATE SURGERY      Right Knee Arthroscopy  2018        Family History:  Family History   Problem Relation Age of Onset    Cancer Mother     Heart disease Father         Social History:  Social History     Tobacco Use    Smoking status: Former     Types: Cigarettes     Quit date: 1978     Years since quittin.3    Smokeless tobacco: Never   Substance and Sexual Activity    Alcohol use: No    Drug use: No    Sexual activity: Not Currently         ROS   Review of Systems   Constitutional:  Negative for fever.   HENT:  Negative for sore throat and tinnitus.    Eyes:  Negative for blurred vision.   Respiratory:  Negative for shortness of breath.    Cardiovascular:   "Negative for chest pain.   Gastrointestinal:  Positive for nausea. Negative for abdominal pain and vomiting.   Genitourinary:  Negative for dysuria.   Musculoskeletal:  Negative for back pain.   Skin:  Positive for wound. Negative for rash.   Neurological:  Negative for weakness and numbness.   Hematological:  Does not bruise/bleed easily.     PHYSICAL EXAM     Initial Vitals [04/22/23 1133]   BP Pulse Resp Temp SpO2   (!) 191/93 (!) 53 20 97.9 °F (36.6 °C) 97 %      MAP       --           Physical Exam    Nursing note and vitals reviewed.  Constitutional: He appears well-developed and well-nourished. He is not diaphoretic. No distress.   HENT:   Head: Normocephalic and atraumatic. Head is without raccoon's eyes and without Howard's sign.   Right Ear: No hemotympanum.   Left Ear: No hemotympanum.   Bruising across bridge of nose.   Eyes: EOM are normal. Pupils are equal, round, and reactive to light. Right eye exhibits no discharge. Left eye exhibits no discharge.   Neck: Neck supple.   Normal range of motion.  Cardiovascular:  Normal rate.           Pulmonary/Chest: No respiratory distress.   Abdominal: He exhibits no distension.   Musculoskeletal:         General: Normal range of motion.      Cervical back: Normal range of motion and neck supple.     Neurological: He is alert and oriented to person, place, and time. He has normal strength.   Patient AAO x4 at this time.   Skin: Skin is warm and dry.   Psychiatric: He has a normal mood and affect. His behavior is normal. Thought content normal.        ED COURSE   Procedures  ED ONGOING VITALS:  Vitals:    04/22/23 1133 04/22/23 1205 04/22/23 1309   BP: (!) 191/93  (!) 174/82   Pulse: (!) 53     Resp: 20     Temp: 97.9 °F (36.6 °C)     TempSrc: Oral     SpO2: 97%     Weight: 108.3 kg (238 lb 12.1 oz)     Height:  5' 11" (1.803 m)          ABNORMAL LAB VALUES:  Labs Reviewed - No data to display      ALL LAB VALUES:  none      RADIOLOGY STUDIES:  Imaging Results  "             CT Head Without Contrast (Final result)  Result time 04/22/23 12:14:24      Final result by Walker Houser MD (Timothy) (04/22/23 12:14:24)                   Impression:      Normal study.    All CT scans at this facility use dose modulation, iterative reconstructions, and/or weight base dosing when appropriate to reduce radiation dose to as low as reasonably achievable.      Electronically signed by: Walker Houser MD  Date:    04/22/2023  Time:    12:14               Narrative:    EXAMINATION:  CT HEAD WITHOUT CONTRAST    CLINICAL HISTORY:  Head trauma, moderate-severe;    COMPARISON:  None    FINDINGS:  No intracranial acute hemorrhage or acute focal brain parenchymal abnormality is identified.  Calvarium is intact.                                                The above vital signs and test results have been reviewed by the emergency provider.     ED Medications:  Discharge Medication List as of 4/22/2023 12:35 PM        Discharge Medications:  Discharge Medication List as of 4/22/2023 12:35 PM          Follow-up Information       pcp of choice.    Why: As needed             [facility]    Specialty: Emergency Medicine  Why: If symptoms worsen  Contact information:  [id number] [place] [address] [phone]                          I discussed with patient and/or family/caretaker that evaluation in the ED does not suggest any emergent or life threatening medical conditions requiring immediate intervention beyond what was provided in the ED, and I believe patient is safe for discharge. Regardless, an unremarkable evaluation in the ED does not preclude the development or presence of a serious or life threatening condition. As such, patient was instructed to return immediately for any worsening or change in current symptoms.        MEDICAL DECISION MAKING   Medical Decision Making:   Initial Assessment:   Patient presents to the ER for a head  injury.  Differential Diagnosis:   CVA, skull fracture, nasal fracture  Clinical Tests:   Radiological Study: Ordered and Reviewed  ED Management:  Patient is to follow up with PCP and return to the ER for any worsening or concerns.  Patient has denied need for medications.             CLINICAL IMPRESSION       ICD-10-CM ICD-9-CM   1. Fall, initial encounter  W19.XXXA E888.9   2. Injury of head, initial encounter  S09.90XA 959.01       Disposition:   Disposition: Discharged  Condition: Stable       Charles Harding NP  04/22/23 1536

## 2024-12-20 ENCOUNTER — OFFICE VISIT (OUTPATIENT)
Dept: OPHTHALMOLOGY | Facility: CLINIC | Age: 74
End: 2024-12-20
Payer: MEDICARE

## 2024-12-20 DIAGNOSIS — H26.493 POSTERIOR CAPSULAR OPACIFICATION, BOTH EYES: ICD-10-CM

## 2024-12-20 DIAGNOSIS — H04.123 DRY EYES, BILATERAL: ICD-10-CM

## 2024-12-20 DIAGNOSIS — Z96.1 PSEUDOPHAKIA OF BOTH EYES: Primary | ICD-10-CM

## 2024-12-20 PROCEDURE — 99999 PR PBB SHADOW E&M-EST. PATIENT-LVL II: CPT | Mod: PBBFAC,,, | Performed by: STUDENT IN AN ORGANIZED HEALTH CARE EDUCATION/TRAINING PROGRAM

## 2024-12-20 NOTE — PROGRESS NOTES
HPI     Cataract     Additional comments: Pt experiencing star bursts at night. Pt states   vision has been getting worse. Pt had cataract surgery roughly 5 years   ago.           Last edited by Karon Vidales on 12/20/2024  2:30 PM.            Assessment /Plan     For exam results, see Encounter Report.    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 Yag laser treatment as needed if visual loss occurs.     Dry eyes, bilateral- ATs QID and lid hygiene w/ baby shampoo      RTC 1 year DFE or PRN